# Patient Record
Sex: FEMALE | Race: BLACK OR AFRICAN AMERICAN | Employment: UNEMPLOYED | URBAN - METROPOLITAN AREA
[De-identification: names, ages, dates, MRNs, and addresses within clinical notes are randomized per-mention and may not be internally consistent; named-entity substitution may affect disease eponyms.]

---

## 2019-06-02 ENCOUNTER — HOSPITAL ENCOUNTER (EMERGENCY)
Facility: CLINIC | Age: 29
Discharge: HOME | End: 2019-06-02
Attending: EMERGENCY MEDICINE | Admitting: EMERGENCY MEDICINE
Payer: MEDICARE

## 2019-06-02 VITALS
OXYGEN SATURATION: 100 % | TEMPERATURE: 98 F | HEART RATE: 77 BPM | RESPIRATION RATE: 16 BRPM | SYSTOLIC BLOOD PRESSURE: 129 MMHG | DIASTOLIC BLOOD PRESSURE: 85 MMHG

## 2019-06-02 DIAGNOSIS — N80.9 ENDOMETRIOSIS: ICD-10-CM

## 2019-06-02 PROCEDURE — 99285 EMERGENCY DEPT VISIT HI MDM: CPT | Mod: 25 | Performed by: EMERGENCY MEDICINE

## 2019-06-02 PROCEDURE — 99285 EMERGENCY DEPT VISIT HI MDM: CPT | Mod: Z6 | Performed by: EMERGENCY MEDICINE

## 2019-06-02 PROCEDURE — 96374 THER/PROPH/DIAG INJ IV PUSH: CPT | Performed by: EMERGENCY MEDICINE

## 2019-06-02 PROCEDURE — 96376 TX/PRO/DX INJ SAME DRUG ADON: CPT | Performed by: EMERGENCY MEDICINE

## 2019-06-02 PROCEDURE — 25000128 H RX IP 250 OP 636: Performed by: EMERGENCY MEDICINE

## 2019-06-02 PROCEDURE — 96361 HYDRATE IV INFUSION ADD-ON: CPT | Performed by: EMERGENCY MEDICINE

## 2019-06-02 PROCEDURE — 96375 TX/PRO/DX INJ NEW DRUG ADDON: CPT | Performed by: EMERGENCY MEDICINE

## 2019-06-02 RX ORDER — ONDANSETRON 2 MG/ML
4 INJECTION INTRAMUSCULAR; INTRAVENOUS ONCE
Status: COMPLETED | OUTPATIENT
Start: 2019-06-02 | End: 2019-06-02

## 2019-06-02 RX ORDER — ONDANSETRON 4 MG/1
4 TABLET, ORALLY DISINTEGRATING ORAL EVERY 8 HOURS PRN
COMMUNITY
Start: 2019-03-09 | End: 2019-06-02

## 2019-06-02 RX ORDER — LORAZEPAM 2 MG/ML
0.5 INJECTION INTRAMUSCULAR ONCE
Status: COMPLETED | OUTPATIENT
Start: 2019-06-02 | End: 2019-06-02

## 2019-06-02 RX ORDER — ONDANSETRON 4 MG/1
4 TABLET, ORALLY DISINTEGRATING ORAL EVERY 8 HOURS PRN
Qty: 10 TABLET | Refills: 0 | Status: SHIPPED | OUTPATIENT
Start: 2019-06-02 | End: 2019-06-05

## 2019-06-02 RX ORDER — HYDROCODONE BITARTRATE AND ACETAMINOPHEN 5; 325 MG/1; MG/1
1-2 TABLET ORAL EVERY 6 HOURS PRN
COMMUNITY
Start: 2019-05-21 | End: 2019-06-02

## 2019-06-02 RX ORDER — HYDROMORPHONE HYDROCHLORIDE 1 MG/ML
0.5 INJECTION, SOLUTION INTRAMUSCULAR; INTRAVENOUS; SUBCUTANEOUS
Status: DISCONTINUED | OUTPATIENT
Start: 2019-06-02 | End: 2019-06-02 | Stop reason: HOSPADM

## 2019-06-02 RX ORDER — HYDROCODONE BITARTRATE AND ACETAMINOPHEN 5; 325 MG/1; MG/1
1 TABLET ORAL EVERY 6 HOURS PRN
Qty: 10 TABLET | Refills: 0 | Status: SHIPPED | OUTPATIENT
Start: 2019-06-02 | End: 2019-06-05

## 2019-06-02 RX ADMIN — ONDANSETRON 4 MG: 2 INJECTION INTRAMUSCULAR; INTRAVENOUS at 11:41

## 2019-06-02 RX ADMIN — SODIUM CHLORIDE 500 ML: 9 INJECTION, SOLUTION INTRAVENOUS at 11:40

## 2019-06-02 RX ADMIN — LORAZEPAM 0.5 MG: 2 INJECTION, SOLUTION INTRAMUSCULAR; INTRAVENOUS at 13:56

## 2019-06-02 RX ADMIN — HYDROMORPHONE HYDROCHLORIDE 0.5 MG: 1 INJECTION, SOLUTION INTRAMUSCULAR; INTRAVENOUS; SUBCUTANEOUS at 11:41

## 2019-06-02 RX ADMIN — HYDROMORPHONE HYDROCHLORIDE 0.5 MG: 1 INJECTION, SOLUTION INTRAMUSCULAR; INTRAVENOUS; SUBCUTANEOUS at 13:56

## 2019-06-02 ASSESSMENT — ENCOUNTER SYMPTOMS
DYSURIA: 1
ABDOMINAL PAIN: 1
BACK PAIN: 1
VOMITING: 1
NAUSEA: 1

## 2019-06-02 NOTE — ED NOTES
"Pt was told multiple times to stay in room until ride comes.   Pt walked out of room and out of ER doors and outside.   Writer followed pt outside, pt stated \" I need some fresh air\"    Advised pt that would follow pt back to room as pt received narcotic pain medication and was told to stay in room until ride was available.   Pt was advised ride would be needed at time of administration of narcotic medication.   Pt was sitting on edge of building and sister was in red car and drove car around  [place].  Security in waiting room called  and pt waved sister to park car.   Sister came into building demanding that narcotics are worn off and can drive at this time.   Charge nurse talking with sister at this time.    Pt continues to sit outside of building.   "

## 2019-06-02 NOTE — ED PROVIDER NOTES
"  History     Chief Complaint   Patient presents with     Back Pain     rt and buttock pain x 2 days     HPI  Winifred Ibarra is a 28 year old female who presents to the ED for evaluation of lower abdominal and back pain. The patient underwent a diagnostic laparoscopy for suspected endometriosis last week at [facility] with Dr. Gillespie, who prescribed her Norco for pain management. She states that she has experienced nausea with vomiting for the past two days and reports \"I can't keep my meds down because of my endometriosis\". She also reports that she as been \"shaky\" lately due to uncontrolled pain and is unable to self-administer her Lovenox injection, which she is on for a past history of pulmonary embolism. The patient also notes pain with urination and bowel movements, which is typical for her.  She took Zofran at home with no improvement of nausea or vomiting.       Allergies:  Allergies   Allergen Reactions     Gold      Silver      Sodium Hypochlorite      Bleach         Problem List:    There are no active problems to display for this patient.       Past Medical History:    No past medical history on file.    Past Surgical History:    No past surgical history on file.    Family History:    No family history on file.    Social History:  Marital Status:  Single [1]  Social History     Tobacco Use     Smoking status: Not on file   Substance Use Topics     Alcohol use: Not on file     Drug use: Not on file        Medications:      HYDROcodone-acetaminophen (NORCO) 5-325 MG tablet   medical cannabis (Patient's own supply)   ondansetron (ZOFRAN ODT) 4 MG ODT tab         Review of Systems   Constitutional: Negative for chills and fever.   HENT: Negative for congestion and sore throat.    Eyes: Negative for discharge.   Respiratory: Negative for cough and shortness of breath.    Cardiovascular: Negative for chest pain and leg swelling.   Gastrointestinal: Positive for abdominal pain, nausea and vomiting. " Negative for diarrhea.   Genitourinary: Positive for dysuria. Negative for frequency.   Musculoskeletal: Positive for back pain. Negative for myalgias.   Skin: Negative for color change and rash.   Neurological: Positive for tremors. Negative for weakness, light-headedness and headaches.   Hematological: Negative for adenopathy.   Psychiatric/Behavioral: Negative for agitation, behavioral problems and confusion. The patient is nervous/anxious.        Physical Exam   BP: 132/87  Pulse: 71  Heart Rate: 106  Temp: 98  F (36.7  C)  Resp: 16  SpO2: (!) 74 %      Physical Exam   Constitutional: She is oriented to person, place, and time. She appears well-developed and well-nourished. She appears distressed (anxious).   HENT:   Head: Normocephalic and atraumatic.   Mouth/Throat: Oropharynx is clear and moist.   Eyes: Pupils are equal, round, and reactive to light. Conjunctivae are normal.   Neck: Normal range of motion. Neck supple. No tracheal deviation present. No thyromegaly present.   Cardiovascular: Normal rate, regular rhythm, normal heart sounds and intact distal pulses.   No murmur heard.  Pulmonary/Chest: Effort normal and breath sounds normal. No respiratory distress. She has no wheezes. She exhibits no tenderness.   Abdominal: Soft. She exhibits no distension. There is tenderness.       Musculoskeletal: She exhibits no edema or tenderness.   Neurological: She is alert and oriented to person, place, and time. She has normal strength. No sensory deficit.   Skin: Skin is warm. No rash noted.   Psychiatric: She has a normal mood and affect. Her behavior is normal.       ED Course        Procedures                 No results found for this or any previous visit (from the past 24 hour(s)).    Medications   ondansetron (ZOFRAN) injection 4 mg (4 mg Intravenous Given 6/2/19 1141)   0.9% sodium chloride BOLUS (0 mLs Intravenous Stopped 6/2/19 1414)   LORazepam (ATIVAN) injection 0.5 mg (0.5 mg Intravenous Given 6/2/19  1356)         11:11 Patient assessed.      Assessments & Plan (with Medical Decision Making)   28-year-old female with a history of chronic lower abdominal pain which reportedly is related to endometriosis.  She has home pain medications, but has been unable to take secondary to nausea and vomiting.  She has not been able to take her medications over the past 2 days.  She is starting to feel shaky.  She has no fever.  She has a nonsurgical abdomen.  There is no evidence of systemic infection.  She is likely having some degree of narcotic withdrawal.  She was given fluids, pain medications, and antiemetics with ultimate improvement.  She will be discharged with follow-up per primary care.    I have reviewed the nursing notes.    I have reviewed the findings, diagnosis, plan and need for follow up with the patient.       Medication List      Modified    HYDROcodone-acetaminophen 5-325 MG tablet  Commonly known as:  NORCO  1 tablet, Oral, EVERY 6 HOURS PRN  What changed:      how much to take    reasons to take this     ondansetron 4 MG ODT tab  Commonly known as:  ZOFRAN ODT  4 mg, Oral, EVERY 8 HOURS PRN  What changed:  reasons to take this        ASK your doctor about these medications    LOVENOX 60 MG/0.6ML syringe  Generic drug:  enoxaparin  Ask about: Should I take this medication?            Final diagnoses:   Endometriosis       This document serves as a record of the services and decisions personally performed and made by Kodak Rooney MD. It was created on HIS/HER behalf by Ligia Velazquez, a trained medical scribe. The creation of this document is based the provider's statements to the medical scribe.  Ligia Velazquez 11:11 AM 6/2/2019    Provider:   The information in this document, created by the medical scribe for me, accurately reflects the services I personally performed and the decisions made by me. I have reviewed and approved this document for accuracy prior to leaving the patient care  area.  Kodak Rooney MD 11:11 AM 6/2/2019 6/2/2019   [facility] EMERGENCY DEPARTMENT     Kodak Rooney MD  06/05/19 1002

## 2019-06-02 NOTE — ED NOTES
"Pt reports had surgery for endometriosis last week at [facility] with Dr. Pierre.  Pt states \" every month around this same time I fall and can't keep my meds down because of my endometriosis\"    Pt's sister drove self to ER and is being seen in UC for pain and hurt herself at work and \"she's unable to help me and I'm not able to help her\"   Pt unable to give shot of lovenox to self \"because my hands are shaking and Im in pain\"  Pt states \" I haven't slept in 2 days\"    Pt reports hard to pee and poop and throwing up all due to endometriosis.  Pt has been falling and \"that has been since I\"ve been little and nobody knows why\"   Pt offered warm blanket, pt declined  "

## 2019-06-02 NOTE — ED NOTES
Upon reviewing discharge instructions pt is informed that because she rec'd narcotics she needs a ride home and the  needs to present to the pt's room.  Pt is calling for ride now.

## 2019-06-02 NOTE — ED AVS SNAPSHOT
[facility] Emergency Department  [address] [facility] [address]  Phone:  [phone]  Fax:  [phone]                                    Winifred Ibarra   MRN: [id number]    Department:  [facility] Emergency Department   Date of Visit:  6/2/2019           After Visit Summary Signature Page    I have received my discharge instructions, and my questions have been answered. I have discussed any challenges I see with this plan with the nurse or doctor.    ..........................................................................................................................................  Patient/Patient Representative Signature      ..........................................................................................................................................  Patient Representative Print Name and Relationship to Patient    ..................................................               ................................................  Date                                   Time    ..........................................................................................................................................  Reviewed by Signature/Title    ...................................................              ..............................................  Date                                               Time          22EPIC Rev 08/18

## 2019-06-02 NOTE — ED NOTES
"Pt standing by side of bed rocking back and forth.  Offered pt help,  Pt stated \" Im always going to be in pain\"   And didn't want any help.   Provider updated.   "

## 2019-06-05 ASSESSMENT — ENCOUNTER SYMPTOMS
CHILLS: 0
MYALGIAS: 0
AGITATION: 0
FEVER: 0
SHORTNESS OF BREATH: 0
COLOR CHANGE: 0
SORE THROAT: 0
CONFUSION: 0
NERVOUS/ANXIOUS: 1
WEAKNESS: 0
COUGH: 0
HEADACHES: 0
ADENOPATHY: 0
TREMORS: 1
EYE DISCHARGE: 0
DIARRHEA: 0
FREQUENCY: 0
LIGHT-HEADEDNESS: 0

## 2019-06-08 ENCOUNTER — APPOINTMENT (OUTPATIENT)
Dept: CT IMAGING | Facility: CLINIC | Age: 29
End: 2019-06-08
Attending: NURSE PRACTITIONER
Payer: MEDICARE

## 2019-06-08 ENCOUNTER — HOSPITAL ENCOUNTER (EMERGENCY)
Facility: CLINIC | Age: 29
Discharge: HOME | End: 2019-06-08
Attending: NURSE PRACTITIONER | Admitting: NURSE PRACTITIONER
Payer: MEDICARE

## 2019-06-08 VITALS
WEIGHT: 293 LBS | HEIGHT: 66 IN | SYSTOLIC BLOOD PRESSURE: 176 MMHG | OXYGEN SATURATION: 100 % | RESPIRATION RATE: 18 BRPM | BODY MASS INDEX: 47.09 KG/M2 | TEMPERATURE: 98.1 F | HEART RATE: 65 BPM | DIASTOLIC BLOOD PRESSURE: 93 MMHG

## 2019-06-08 DIAGNOSIS — M54.41 ACUTE LEFT-SIDED LOW BACK PAIN WITH RIGHT-SIDED SCIATICA: ICD-10-CM

## 2019-06-08 DIAGNOSIS — M62.830 BACK MUSCLE SPASM: ICD-10-CM

## 2019-06-08 LAB
ALBUMIN UR-MCNC: NEGATIVE MG/DL
AMORPH CRY #/AREA URNS HPF: ABNORMAL /HPF
APPEARANCE UR: ABNORMAL
BILIRUB UR QL STRIP: NEGATIVE
COLOR UR AUTO: ABNORMAL
GLUCOSE UR STRIP-MCNC: NEGATIVE MG/DL
HCG UR QL: NEGATIVE
HGB UR QL STRIP: ABNORMAL
KETONES UR STRIP-MCNC: NEGATIVE MG/DL
LEUKOCYTE ESTERASE UR QL STRIP: NEGATIVE
MUCOUS THREADS #/AREA URNS LPF: PRESENT /LPF
NITRATE UR QL: NEGATIVE
PH UR STRIP: 7.5 PH (ref 5–7)
RBC #/AREA URNS AUTO: 1 /HPF (ref 0–2)
SOURCE: ABNORMAL
SP GR UR STRIP: 1.01 (ref 1–1.03)
SQUAMOUS #/AREA URNS AUTO: 3 /HPF (ref 0–1)
UROBILINOGEN UR STRIP-MCNC: NORMAL MG/DL (ref 0–2)
WBC #/AREA URNS AUTO: 4 /HPF (ref 0–5)

## 2019-06-08 PROCEDURE — 25000132 ZZH RX MED GY IP 250 OP 250 PS 637: Mod: GY | Performed by: NURSE PRACTITIONER

## 2019-06-08 PROCEDURE — 74176 CT ABD & PELVIS W/O CONTRAST: CPT

## 2019-06-08 PROCEDURE — 81001 URINALYSIS AUTO W/SCOPE: CPT | Performed by: NURSE PRACTITIONER

## 2019-06-08 PROCEDURE — 81025 URINE PREGNANCY TEST: CPT | Performed by: NURSE PRACTITIONER

## 2019-06-08 PROCEDURE — 99284 EMERGENCY DEPT VISIT MOD MDM: CPT | Mod: 25

## 2019-06-08 PROCEDURE — A9270 NON-COVERED ITEM OR SERVICE: HCPCS | Mod: GY | Performed by: NURSE PRACTITIONER

## 2019-06-08 PROCEDURE — 25000128 H RX IP 250 OP 636: Performed by: NURSE PRACTITIONER

## 2019-06-08 PROCEDURE — 96372 THER/PROPH/DIAG INJ SC/IM: CPT

## 2019-06-08 RX ORDER — LIDOCAINE 4 G/G
2 PATCH TOPICAL ONCE
Status: DISCONTINUED | OUTPATIENT
Start: 2019-06-08 | End: 2019-06-08 | Stop reason: HOSPADM

## 2019-06-08 RX ORDER — KETOROLAC TROMETHAMINE 30 MG/ML
30 INJECTION, SOLUTION INTRAMUSCULAR; INTRAVENOUS ONCE
Status: COMPLETED | OUTPATIENT
Start: 2019-06-08 | End: 2019-06-08

## 2019-06-08 RX ORDER — METHOCARBAMOL 500 MG/1
1000 TABLET, FILM COATED ORAL ONCE
Status: COMPLETED | OUTPATIENT
Start: 2019-06-08 | End: 2019-06-08

## 2019-06-08 RX ORDER — METHOCARBAMOL 500 MG/1
TABLET, FILM COATED ORAL
Qty: 24 TABLET | Refills: 0 | Status: SHIPPED | OUTPATIENT
Start: 2019-06-08 | End: 2020-12-10

## 2019-06-08 RX ADMIN — METHOCARBAMOL 1000 MG: 500 TABLET ORAL at 11:40

## 2019-06-08 RX ADMIN — KETOROLAC TROMETHAMINE 30 MG: 30 INJECTION, SOLUTION INTRAMUSCULAR at 11:39

## 2019-06-08 RX ADMIN — LIDOCAINE 2 PATCH: 560 PATCH PERCUTANEOUS; TOPICAL; TRANSDERMAL at 11:40

## 2019-06-08 ASSESSMENT — ENCOUNTER SYMPTOMS
FEVER: 0
BACK PAIN: 1
ABDOMINAL PAIN: 0
CHILLS: 0
DYSURIA: 0
FLANK PAIN: 1

## 2019-06-08 ASSESSMENT — MIFFLIN-ST. JEOR: SCORE: 2189.65

## 2019-06-08 NOTE — ED AVS SNAPSHOT
[facility] Emergency Department  [address]  [facility] [address]  Phone:  [phone]  Fax:  [phone]                                    Winifred Ibarra   MRN: [id number]    Department:  [facility] Emergency Department   Date of Visit:  6/8/2019           After Visit Summary Signature Page    I have received my discharge instructions, and my questions have been answered. I have discussed any challenges I see with this plan with the nurse or doctor.    ..........................................................................................................................................  Patient/Patient Representative Signature      ..........................................................................................................................................  Patient Representative Print Name and Relationship to Patient    ..................................................               ................................................  Date                                   Time    ..........................................................................................................................................  Reviewed by Signature/Title    ...................................................              ..............................................  Date                                               Time          22EPIC Rev 08/18

## 2019-06-08 NOTE — ED TRIAGE NOTES
"Patient states \"I have right buttock pain, I have endometriosis and this is the same pain.  i've had it for 2 weeks, I've been to the ED twice for the same pain and my right arm hurts from having to hold it back there.\"  Patient is pointing to right flank for location of pain.  She holds her right arm around to put pressure on her flank.  ABCDs intact.  "

## 2019-06-08 NOTE — ED NOTES
Pt in room upset and crying. When asked what was wrong pt stated nothing. Pt given box of kleenex and told that said writer would stop back to check on her. If she needs anything to hit red button on call light. MD informed that pt was upset and crying.

## 2019-06-08 NOTE — ED PROVIDER NOTES
History     Chief Complaint:  Right flank pain    HPI   Winifred Ibarra is a 28 year old female with a history of blood clots who presents to the emergency department today with right flank pain. Patient states that she has endometriosis and has had a flare in the last week in her right flank and right buttock. Patient reports associated right arm pain because the pain is better when she pushes on the area. This pain has caused her to go to the ER multiple times looking for pain relief in order to be able to sleep and eat. Patient denies rash. Patient's OB GYN is on vacation and she is not able to get a shot until she comes back. Patient has been taking Norco and Topamax. Ibuprofen and Tylenol have not been helping. Patient had a CT scan of her abdomen 5 days ago, results below. She underwent a diagnostic laparoscopy showed confirmation of endometriosis on 5/22/19.     CT Abdomen Pelvis 6/3/19  IMPRESSION:   CONCLUSION:   1.  Small left adnexal cyst, presumed physiologic.  2.  No other findings to account for the patient's symptoms.    Allergies:  Gold  Silver  Sodium Hypochlorite    Medications:    Medical cannabis   Depakote  Seroquel  Haldol   Norco  Zofran   Topamax     Past Medical History:    Bipolar   Acute stress reaction  Suicidal ideation  Obesity  Pulmonary embolism  Endometriosis   Panic attacks   PTSD  PCD  Depression with anxiety   Dysmenorrhea     Past Surgical History:    History reviewed. No pertinent past surgical history.    Family History:    History reviewed. No pertinent family history.     Social History:  The patient was alone.  Smoking Status: Former  Smokeless Tobacco: Never  Alcohol Use: No    Marital Status:  Single     Review of Systems   Constitutional: Negative for chills and fever.   Gastrointestinal: Negative for abdominal pain.   Genitourinary: Positive for flank pain (right, radiating into right buttock). Negative for dysuria and vaginal bleeding.   Musculoskeletal: Positive for  "back pain.   Skin: Negative for rash.   All other systems reviewed and are negative.      Physical Exam     Patient Vitals for the past 24 hrs:   BP Temp Temp src Pulse Resp SpO2 Height Weight   06/08/19 1107 (!) 176/93 98.1  F (36.7  C) Temporal 65 18 100 % 1.676 m (5' 6\") 143.8 kg (317 lb)      Physical Exam  General: Alert, Mild  discomfort, well kept, morbidly obese  Eyes: PERRL, conjunctivae pink no scleral icterus or conjunctival injection  ENT:   Moist mucus membranes, posterior oropharynx clear without erythema or exudates, No lymphadenopathy, Normal voice  Resp:  Lungs clear to auscultation bilaterally, no crackles/rubs/wheezes. Good air movement  CV:  Normal rate and rhythm, no murmurs/rubs/gallops  GI:  Abdomen soft and non-distended.  Normoactive BS.  No tenderness, guarding or rebound, No masses  Skin:  Warm, dry.  No rashes or petechiae  Musculoskeletal: No peripheral edema or calf tenderness, Normal gross ROM, lumbar sacral paravertebral spasming with tenderness along the sciatic tract  Neuro: Alert and oriented to person/place/time, normal sensation  Psychiatric: Normal affect, cooperative, good eye contact  Emergency Department Course   Imaging:  Radiology findings were communicated with the patient  who voiced understanding of the findings.  Abd/pelvis CT no contrast - Stone Protocol   Final Result      IMPRESSION. :   1. No specific cause for right flank or back pain demonstrated.  Report per radiology      Laboratory:  Laboratory findings were communicated with the patient  who voiced understanding of the findings.  HCG Qualitative: negative   UA: slightly cloudy, light yellow urine with trace blood, 7.5 pH, 3 Squamous Epithelial, mucous present and few amorphous crystals o/w WNL      Interventions:  1139: Toradol 30 mg Intramuscular   1140: Lidocare 2 patches Transdermal   1140: Robaxin 1,000 mg PO      Emergency Department Course:  Nursing notes and vitals reviewed.  1120: I performed an exam " "of the patient as documented above.   The patient provided a urine sample here in the emergency department. This was sent for laboratory testing, findings above.  The patient was sent for a Abdomen Pelvis CT while in the emergency department, results above.   1253: Patient rechecked and updated.  Patient feels improved.   1404: Findings and plan explained to the Patient. Patient discharged home with instructions regarding supportive care, medications, and reasons to return. The importance of close follow-up was reviewed. The patient was prescribed Robaxin.    I personally reviewed the laboratory and imaging results with the Patient and answered all related questions prior to discharge.     Impression & Plan       Medical Decision Making:  Winifred Ibarra is a 28 year old female who presents today for evaluation of ongoing lower back discomfort.  She associated this with her endometrial pain however she did not have abdominal discomfort.  She described having right arm soreness as she is been having to \"press into her lower back\" to help with the discomfort.  Her examination showed a likely musculoskeletal source with paravertebral spasming.  She did have a small amount of blood in her urine so I did obtain a CT scan.  This showed no acute process.  She did have good relief with the above treatment.  This appears to be a musculoskeletal source and likely unrelated to her endometriosis.  She is advised on appropriate use of NSAIDs, lidocaine patches, and given a prescription for methocarbamol.  She has plan to follow-up with her primary care provider/OB this coming week.  No indication for further testing or treatment at this time.  Initial blood pressure was elevated.  This is most likely due to her discomfort.  As her symptoms are decreased so did her blood pressure.  I did advise her to follow-up with primary care regarding the possible elevated blood pressure.  She appears to be safe and appropriate for outpatient " management follow-up and is discharged home.         Diagnosis:    ICD-10-CM    1. Acute left-sided low back pain with right-sided sciatica M54.41    2. Back muscle spasm M62.830        Disposition:  discharged to home    Discharge Medications:     Medication List      Started    methocarbamol 500 MG tablet  Commonly known as:  ROBAXIN  1-2 tabs q TID PRN for muscle spasm/pain          Scribe Disclosure:  I, Krystle Michelle, am serving as a scribe at 11:15 AM on 6/8/2019 to document services personally performed by Tremayne Bran APRN based on my observations and the provider's statements to me.   6/8/2019   [facility] EMERGENCY DEPARTMENT       Tremayne Bran APRN CNP  06/08/19 1636

## 2019-06-08 NOTE — DISCHARGE INSTRUCTIONS
Ibuprofen or Naproxen and/or Tylenol scheduled for the next 3-5 days. 600 mg (three tabs) ibuprofen, 440 mg (two tabs) Naproxen, 650-1000 mg of Tylenol.  Ibuprofen and Tylenol are every 6 hours Naproxen is 2 times daily. Follow directions. Use OTC lidocaine patches as directed.   Ice or heat to area.  I recommend ice in the first 24-48 hours.    Discharge Instructions  Back Pain  You were seen today for back pain. Back pain can have many causes, but most will get better without surgery or other specific treatment. Sometimes there is a herniated (?slipped?) disc. We do not usually do MRI scans to look for these right away, since most herniated discs will get better on their own with time.  Today, we did not find any evidence that your back pain was caused by a serious condition. However, sometimes symptoms develop over time and cannot be found during an emergency visit, so it is very important that you follow up with your primary provider.  Generally, every Emergency Department visit should have a follow-up clinic visit with either a primary or a specialty clinic/provider. Please follow-up as instructed by your emergency provider today.    Return to the Emergency Department if:  You develop a fever with your back pain.   You have weakness or change in sensation in one or both legs.  You lose control of your bowels or bladder, or cannot empty your bladder (cannot pee).  Your pain gets much worse.     Follow-up with your provider:  Unless your pain has completely gone away, please make an appointment with your provider within one week. Most of the routine care for back pain is available in a clinic and not the Emergency Department. You may need further management of your back pain, such as more pain medication, imaging such as an X-ray or MRI, or physical therapy.    What can I do to help myself?  Remain Active -- People are often afraid that they will hurt their back further or delay recovery by remaining active, but  this is one of the best things you can do for your back. In fact, staying in bed for a long time to rest is not recommended. Studies have shown that people with low back pain recover faster when they remain active. Movement helps to bring blood flow to the muscles and relieve muscle spasms as well as preventing loss of muscle strength.  Heat -- Using a heating pad can help with low back pain during the first few weeks. Do not sleep with a heating pad, as you can be burned.   Pain medications - You may take a pain medication such as Tylenol  (acetaminophen), Advil , Motrin  (ibuprofen) or Aleve  (naproxen).  If you were given a prescription for medicine here today, be sure to read all of the information (including the package insert) that comes with your prescription.  This will include important information about the medicine, its side effects, and any warnings that you need to know about.  The pharmacist who fills the prescription can provide more information and answer questions you may have about the medicine.  If you have questions or concerns that the pharmacist cannot address, please call or return to the Emergency Department.   Remember that you can always come back to the Emergency Department if you are not able to see your regular provider in the amount of time listed above, if you get any new symptoms, or if there is anything that worries you.

## 2019-10-26 ENCOUNTER — OFFICE VISIT - HEALTHEAST (OUTPATIENT)
Dept: FAMILY MEDICINE | Facility: CLINIC | Age: 29
End: 2019-10-26

## 2019-10-26 DIAGNOSIS — S83.411A SPRAIN OF MEDIAL COLLATERAL LIGAMENT OF RIGHT KNEE, INITIAL ENCOUNTER: ICD-10-CM

## 2019-10-26 ASSESSMENT — MIFFLIN-ST. JEOR: SCORE: 2006.15

## 2020-05-03 ENCOUNTER — HOSPITAL ENCOUNTER (EMERGENCY)
Facility: CLINIC | Age: 30
Discharge: HOME | End: 2020-05-03
Attending: FAMILY MEDICINE | Admitting: FAMILY MEDICINE
Payer: MEDICARE

## 2020-05-03 VITALS
HEIGHT: 66 IN | HEART RATE: 63 BPM | BODY MASS INDEX: 47.09 KG/M2 | RESPIRATION RATE: 18 BRPM | SYSTOLIC BLOOD PRESSURE: 113 MMHG | TEMPERATURE: 97 F | WEIGHT: 293 LBS | OXYGEN SATURATION: 100 % | DIASTOLIC BLOOD PRESSURE: 67 MMHG

## 2020-05-03 DIAGNOSIS — M54.50 ACUTE LOW BACK PAIN, UNSPECIFIED BACK PAIN LATERALITY, UNSPECIFIED WHETHER SCIATICA PRESENT: ICD-10-CM

## 2020-05-03 DIAGNOSIS — R10.2 PELVIC PAIN: ICD-10-CM

## 2020-05-03 PROCEDURE — 96372 THER/PROPH/DIAG INJ SC/IM: CPT | Performed by: FAMILY MEDICINE

## 2020-05-03 PROCEDURE — 25000128 H RX IP 250 OP 636: Performed by: FAMILY MEDICINE

## 2020-05-03 PROCEDURE — 99285 EMERGENCY DEPT VISIT HI MDM: CPT | Performed by: FAMILY MEDICINE

## 2020-05-03 PROCEDURE — 99285 EMERGENCY DEPT VISIT HI MDM: CPT | Mod: Z6 | Performed by: FAMILY MEDICINE

## 2020-05-03 RX ORDER — LIDOCAINE 50 MG/G
1 PATCH TOPICAL EVERY 24 HOURS
Qty: 10 PATCH | Refills: 0 | Status: SHIPPED | OUTPATIENT
Start: 2020-05-03 | End: 2020-05-13

## 2020-05-03 RX ORDER — CELECOXIB 200 MG/1
200 CAPSULE ORAL DAILY PRN
Qty: 15 CAPSULE | Refills: 0 | Status: SHIPPED | OUTPATIENT
Start: 2020-05-03 | End: 2020-11-22

## 2020-05-03 RX ADMIN — HYDROMORPHONE HYDROCHLORIDE 1 MG: 1 INJECTION, SOLUTION INTRAMUSCULAR; INTRAVENOUS; SUBCUTANEOUS at 11:20

## 2020-05-03 ASSESSMENT — MIFFLIN-ST. JEOR: SCORE: 2130.22

## 2020-05-03 NOTE — DISCHARGE INSTRUCTIONS
Take Celebrex 200 mg once daily.  Continue acetaminophen 1000 mg 4 times daily.  Apply the Lidoderm patch for 12 hours at a time with a 12-hour period of having the patch off.  You may do this either during the day or at night, which ever you think may be more beneficial.  Follow-up with your primary care physician if you have continued symptoms.

## 2020-05-03 NOTE — ED NOTES
Patient c/o low back pain radiating down to right buttock into thigh.  No recent injury.  Tingling to right foot.  Not incontinent .  Having menses currently.  Legs elevated for moderate relief of pain.  Ice pack given   Patient stated pain due to endometriosis

## 2020-05-03 NOTE — ED PROVIDER NOTES
History     Chief Complaint   Patient presents with     Back Pain     right lower back pain radiating right leg.  Pt reports n/v due to endometriosis.        HPI    Winifred Ibarra is a 29 year old female who presents with back pain and pelvic pain.  This is a typical flareup of what she believes is endometriosis that comes with her menstrual cycles.  She states that when she gets that it is difficult for her to walk and therefore difficult to take care of her children.  She treats this with medical marijuana which she takes by tablet and by vaping.  She has also using acetaminophen but it does not give complete relief.  She says she cannot take ibuprofen because it upsets her stomach.  She is been given hydrocodone in the past but does not have anymore.  She has she is had an extensive work-up for multiple medical complaints including brain MRI cervical and lumbar spine MRIs and most recently electromyography.  She is also had electroencephalography.  These results have all been reassuring with a few nonspecific findings on MRI of the spine which have been stable on repeat testing.  She has no evidence of a high-grade radiculopathy or of significant spinal stenosis on her work-ups.  I reviewed her records through multiple different care systems through care everywhere.  She says she is intending to go for a consult at the [facility] but has not heard back.  She states that she has vomiting associated with her current symptoms which makes it difficult for her to take her medication.  She is been constipated.  She has not had diarrhea.  She does not have chest pain, shortness of breath, fevers, sore throat, myalgias.    Allergies:  Allergies   Allergen Reactions     Gold      Silver      Sodium Hypochlorite      Bleach         Problem List:    There are no active problems to display for this patient.       Past Medical History:    No past medical history on file.    Past Surgical History:    No past surgical  "history on file.    Family History:    No family history on file.    Social History:  Marital Status:  Single [1]  Social History     Tobacco Use     Smoking status: Not on file   Substance Use Topics     Alcohol use: Not on file     Drug use: Not on file        Medications:    celecoxib (CELEBREX) 200 MG capsule  lidocaine (LIDODERM) 5 % patch  medical cannabis (Patient's own supply)  methocarbamol (ROBAXIN) 500 MG tablet          Review of Systems    All other systems are reviewed and are negative    Physical Exam   BP: (!) 134/96  Pulse: 63  Heart Rate: 97  Temp: 97  F (36.1  C)  Resp: 18  Height: 167.6 cm (5' 6\")  Weight: 138.3 kg (305 lb)  SpO2: 100 %      Physical Exam    Nursing note and vitals were reviewed.  Constitutional: Awake and alert, morbidly obese 29-year-old in no apparent discomfort, writhing on the exam table, who does not appear acutely ill, and who answers questions appropriately and cooperates with examination.  HEENT: EOMI.   Neck: Freely mobile.  Cardiovascular: Cardiac examination reveals normal heart rate and regular rhythm without murmur.  Pulmonary/Chest: Breathing is unlabored.  Breath sounds are clear and equal bilaterally.  There no retractions, tachypnea, rales, wheezes, or rhonchi.  Musculoskeletal: Extremities are warm and well-perfused and without edema  Neurological: Alert, oriented, thought content logical, coherent.  Motor tone is normal.  Motor strength is intact on extension of the great toe, ankle dorsiflexion and plantar flexion, hip extension bilaterally.  No clonus.  Skin: Warm, dry, no rashes.  Psychiatric: Affect congruent with acute pain.      ED Course        Procedures               Critical Care time:  none               No results found for this or any previous visit (from the past 24 hour(s)).    Medications   HYDROmorphone (DILAUDID) injection 1 mg (1 mg Intramuscular Given 5/3/20 1120)       Assessments & Plan (with Medical Decision Making)     29-year-old " female with a long history of chronic pelvic and low back pain attributed to endometriosis and extensive work-up for numerous symptoms all reassuring presented with recurrence of her typical symptoms.  There was no indication for additional testing.  She received a dose of Dilaudid with good relief of her symptoms.  She has not tolerated ibuprofen in the past so she will try celecoxib.  She will also try Lidoderm patch which has helped in the past.  She will continue on Tylenol.  If she has persistent symptoms, she should follow-up in primary care or with 1 of her specialists.  She should return if new concerning symptoms develop.    I have reviewed the nursing notes.    I have reviewed the findings, diagnosis, plan and need for follow up with the patient.          Discharge Medication List as of 5/3/2020 11:54 AM      START taking these medications    Details   celecoxib (CELEBREX) 200 MG capsule Take 1 capsule (200 mg) by mouth daily as needed for moderate pain, Disp-15 capsule,R-0, E-Prescribe      lidocaine (LIDODERM) 5 % patch Place 1 patch onto the skin every 24 hours for 10 daysDisp-10 patch,[id number]             Final diagnoses:   Acute low back pain, unspecified back pain laterality, unspecified whether sciatica present   Pelvic pain       5/3/2020   [facility] EMERGENCY DEPARTMENT     Devin Sloan MD  05/03/20 2543

## 2020-05-03 NOTE — ED AVS SNAPSHOT
[facility] Emergency Department  [address] [facility] [address]  Phone:  [phone]  Fax:  [phone]                                    Winifred Ibarra   MRN: [id number]    Department:  [facility] Emergency Department   Date of Visit:  5/3/2020           After Visit Summary Signature Page    I have received my discharge instructions, and my questions have been answered. I have discussed any challenges I see with this plan with the nurse or doctor.    ..........................................................................................................................................  Patient/Patient Representative Signature      ..........................................................................................................................................  Patient Representative Print Name and Relationship to Patient    ..................................................               ................................................  Date                                   Time    ..........................................................................................................................................  Reviewed by Signature/Title    ...................................................              ..............................................  Date                                               Time          22EPIC Rev 08/18

## 2020-06-01 ENCOUNTER — HOSPITAL ENCOUNTER (EMERGENCY)
Facility: CLINIC | Age: 30
Discharge: HOME | End: 2020-06-01
Attending: PHYSICIAN ASSISTANT | Admitting: PHYSICIAN ASSISTANT
Payer: MEDICARE

## 2020-06-01 VITALS
DIASTOLIC BLOOD PRESSURE: 77 MMHG | BODY MASS INDEX: 47.09 KG/M2 | WEIGHT: 293 LBS | TEMPERATURE: 97.8 F | HEART RATE: 58 BPM | HEIGHT: 66 IN | OXYGEN SATURATION: 100 % | SYSTOLIC BLOOD PRESSURE: 140 MMHG | RESPIRATION RATE: 18 BRPM

## 2020-06-01 DIAGNOSIS — M54.9 ACUTE RIGHT-SIDED BACK PAIN, UNSPECIFIED BACK LOCATION: ICD-10-CM

## 2020-06-01 DIAGNOSIS — R10.2 PELVIC PAIN IN FEMALE: ICD-10-CM

## 2020-06-01 LAB
ALBUMIN SERPL-MCNC: 3.5 G/DL (ref 3.4–5)
ALP SERPL-CCNC: 69 U/L (ref 40–150)
ALT SERPL W P-5'-P-CCNC: 21 U/L (ref 0–50)
ANION GAP SERPL CALCULATED.3IONS-SCNC: 6 MMOL/L (ref 3–14)
AST SERPL W P-5'-P-CCNC: 16 U/L (ref 0–45)
BASOPHILS # BLD AUTO: 0 10E9/L (ref 0–0.2)
BASOPHILS NFR BLD AUTO: 0.3 %
BILIRUB SERPL-MCNC: 0.4 MG/DL (ref 0.2–1.3)
BUN SERPL-MCNC: 8 MG/DL (ref 7–30)
CALCIUM SERPL-MCNC: 8.6 MG/DL (ref 8.5–10.1)
CHLORIDE SERPL-SCNC: 110 MMOL/L (ref 94–109)
CO2 SERPL-SCNC: 25 MMOL/L (ref 20–32)
CREAT SERPL-MCNC: 0.76 MG/DL (ref 0.52–1.04)
DIFFERENTIAL METHOD BLD: NORMAL
EOSINOPHIL # BLD AUTO: 0 10E9/L (ref 0–0.7)
EOSINOPHIL NFR BLD AUTO: 0.2 %
ERYTHROCYTE [DISTWIDTH] IN BLOOD BY AUTOMATED COUNT: 13.5 % (ref 10–15)
GFR SERPL CREATININE-BSD FRML MDRD: >90 ML/MIN/{1.73_M2}
GLUCOSE SERPL-MCNC: 85 MG/DL (ref 70–99)
HCT VFR BLD AUTO: 38.4 % (ref 35–47)
HGB BLD-MCNC: 12.8 G/DL (ref 11.7–15.7)
IMM GRANULOCYTES # BLD: 0 10E9/L (ref 0–0.4)
IMM GRANULOCYTES NFR BLD: 0.2 %
LIPASE SERPL-CCNC: 71 U/L (ref 73–393)
LYMPHOCYTES # BLD AUTO: 1.2 10E9/L (ref 0.8–5.3)
LYMPHOCYTES NFR BLD AUTO: 19.5 %
MCH RBC QN AUTO: 32.2 PG (ref 26.5–33)
MCHC RBC AUTO-ENTMCNC: 33.3 G/DL (ref 31.5–36.5)
MCV RBC AUTO: 97 FL (ref 78–100)
MONOCYTES # BLD AUTO: 0.5 10E9/L (ref 0–1.3)
MONOCYTES NFR BLD AUTO: 7.8 %
NEUTROPHILS # BLD AUTO: 4.5 10E9/L (ref 1.6–8.3)
NEUTROPHILS NFR BLD AUTO: 72 %
NRBC # BLD AUTO: 0 10*3/UL
NRBC BLD AUTO-RTO: 0 /100
PLATELET # BLD AUTO: 294 10E9/L (ref 150–450)
POTASSIUM SERPL-SCNC: 3.7 MMOL/L (ref 3.4–5.3)
PROT SERPL-MCNC: 8.2 G/DL (ref 6.8–8.8)
RBC # BLD AUTO: 3.97 10E12/L (ref 3.8–5.2)
SODIUM SERPL-SCNC: 141 MMOL/L (ref 133–144)
WBC # BLD AUTO: 6.2 10E9/L (ref 4–11)

## 2020-06-01 PROCEDURE — 25800030 ZZH RX IP 258 OP 636: Performed by: PHYSICIAN ASSISTANT

## 2020-06-01 PROCEDURE — 96374 THER/PROPH/DIAG INJ IV PUSH: CPT

## 2020-06-01 PROCEDURE — 80053 COMPREHEN METABOLIC PANEL: CPT | Performed by: PHYSICIAN ASSISTANT

## 2020-06-01 PROCEDURE — 85025 COMPLETE CBC W/AUTO DIFF WBC: CPT | Performed by: PHYSICIAN ASSISTANT

## 2020-06-01 PROCEDURE — 99284 EMERGENCY DEPT VISIT MOD MDM: CPT | Mod: 25

## 2020-06-01 PROCEDURE — 83690 ASSAY OF LIPASE: CPT | Performed by: PHYSICIAN ASSISTANT

## 2020-06-01 PROCEDURE — 25000128 H RX IP 250 OP 636: Performed by: PHYSICIAN ASSISTANT

## 2020-06-01 PROCEDURE — 96372 THER/PROPH/DIAG INJ SC/IM: CPT

## 2020-06-01 PROCEDURE — 96361 HYDRATE IV INFUSION ADD-ON: CPT

## 2020-06-01 PROCEDURE — 99284 EMERGENCY DEPT VISIT MOD MDM: CPT | Mod: Z6 | Performed by: PHYSICIAN ASSISTANT

## 2020-06-01 RX ORDER — SODIUM CHLORIDE 9 MG/ML
1000 INJECTION, SOLUTION INTRAVENOUS CONTINUOUS
Status: DISCONTINUED | OUTPATIENT
Start: 2020-06-01 | End: 2020-06-01 | Stop reason: HOSPADM

## 2020-06-01 RX ORDER — ONDANSETRON 4 MG/1
4 TABLET, ORALLY DISINTEGRATING ORAL EVERY 8 HOURS PRN
Qty: 10 TABLET | Refills: 0 | Status: SHIPPED | OUTPATIENT
Start: 2020-06-01

## 2020-06-01 RX ORDER — ONDANSETRON 2 MG/ML
4 INJECTION INTRAMUSCULAR; INTRAVENOUS EVERY 30 MIN PRN
Status: DISCONTINUED | OUTPATIENT
Start: 2020-06-01 | End: 2020-06-01 | Stop reason: HOSPADM

## 2020-06-01 RX ADMIN — ONDANSETRON 4 MG: 2 INJECTION INTRAMUSCULAR; INTRAVENOUS at 16:38

## 2020-06-01 RX ADMIN — SODIUM CHLORIDE 1000 ML: 9 INJECTION, SOLUTION INTRAVENOUS at 16:37

## 2020-06-01 RX ADMIN — HYDROMORPHONE HYDROCHLORIDE 1 MG: 1 INJECTION, SOLUTION INTRAMUSCULAR; INTRAVENOUS; SUBCUTANEOUS at 16:38

## 2020-06-01 ASSESSMENT — ENCOUNTER SYMPTOMS
VOMITING: 1
RESPIRATORY NEGATIVE: 1
FEVER: 0
NAUSEA: 1
ABDOMINAL DISTENTION: 0
NEUROLOGICAL NEGATIVE: 1
DIARRHEA: 0
FLANK PAIN: 1
BACK PAIN: 1
FATIGUE: 0
CARDIOVASCULAR NEGATIVE: 1
DYSURIA: 0
CONSTIPATION: 0
HEMATOLOGIC/LYMPHATIC NEGATIVE: 1
EYES NEGATIVE: 1
ABDOMINAL PAIN: 0

## 2020-06-01 ASSESSMENT — MIFFLIN-ST. JEOR: SCORE: 2130.22

## 2020-06-01 NOTE — ED AVS SNAPSHOT
[facility] Emergency Department  [address] [facility] [address]  Phone:  [phone]  Fax:  [phone]                                    Winifred Ibarra   MRN: [id number]    Department:  [facility] Emergency Department   Date of Visit:  6/1/2020           After Visit Summary Signature Page    I have received my discharge instructions, and my questions have been answered. I have discussed any challenges I see with this plan with the nurse or doctor.    ..........................................................................................................................................  Patient/Patient Representative Signature      ..........................................................................................................................................  Patient Representative Print Name and Relationship to Patient    ..................................................               ................................................  Date                                   Time    ..........................................................................................................................................  Reviewed by Signature/Title    ...................................................              ..............................................  Date                                               Time          22EPIC Rev 08/18

## 2020-06-01 NOTE — ED PROVIDER NOTES
History     Chief Complaint   Patient presents with     Back Pain     pt states that she has to come in about once a month for fluids and pain medication for her endometriosis. pain is in right back     HPI  Winifred Ibarra is a 29 year old female who presents the emergency department today with back pain and pelvic pain that is typical flareup of what she believes is endometriosis that comes with her menstrual cycles.  Patient is currently menstruating and states that she is sexually active with women only.  Patient denies any concerns for pregnancy.  Patient states that when she gets the back pain it causes nausea and vomiting and she is unable to do her daily activities due to the fact that the pain is so severe.  Patient does treat this with medical marijuana and she also uses Tylenol over-the-counter with minimal relief.  Patient has an appointment at [facility] in a few days for further evaluation of the symptoms.  Patient has had MRIs, CT scans, laparoscopic surgeries in the past, and EMGs for these chronic symptoms.  Patient has an order for neurology consult for follow-up of abnormal brain MRI, they also ordered an MRI of the abdomen pelvis to rule out deep and filtrate of endometriosis this was done through her gynecologist on 5-19-20.  Patient also was given orders for hematology due to her history of multiple pulmonary emboli, cognitive behavioral therapy physical therapy and orthopedics and spine clinic.  Patient states that she is here today because she needs something for the pain since her over-the-counter treatment is working.  Patient in the past has had Celebrex, hydrocodone, and lidocaine patches which do not help.  Patient also states that the lidocaine patch is not covered by her insurance.  Patient states over the past few days she has had vomiting, nausea, and unable to keep anything down due to the pain.  Patient also states that this time the pain seems to be radiating up into the right  flank which is a little different from her previous episodes which she gets monthly and they correlate with her menstrual cycles.  Patient denies fever, headache, confusion, dizziness, chest pain, shortness of breath, heart racing or skipping beats, calf pain or swelling, significant abdominal pain, urinary symptoms/hematuria/dysuria/urgency or frequency, patient denies any vaginal discharge or irritation.    Allergies:  Allergies   Allergen Reactions     Gold      Silver      Sodium Hypochlorite      Bleach         Problem List:    There are no active problems to display for this patient.       Past Medical History:    No past medical history on file.    Past Surgical History:    No past surgical history on file.    Family History:    No family history on file.    Social History:  Marital Status:  Single [1]  Social History     Tobacco Use     Smoking status: Not on file   Substance Use Topics     Alcohol use: Not on file     Drug use: Not on file        Medications:    ondansetron (ZOFRAN ODT) 4 MG ODT tab  celecoxib (CELEBREX) 200 MG capsule  medical cannabis (Patient's own supply)  methocarbamol (ROBAXIN) 500 MG tablet          Review of Systems   Constitutional: Negative for fatigue and fever.   HENT: Negative.    Eyes: Negative.    Respiratory: Negative.    Cardiovascular: Negative.    Gastrointestinal: Positive for nausea and vomiting. Negative for abdominal distention, abdominal pain, constipation and diarrhea.   Genitourinary: Positive for flank pain (right sided lower and mid back pain that radiates into right leg. ), pelvic pain and vaginal bleeding (currently menstrating. ). Negative for decreased urine volume, dysuria, menstrual problem, vaginal discharge and vaginal pain.   Musculoskeletal: Positive for back pain.   Skin: Negative.    Neurological: Negative.    Hematological: Negative.    All other systems reviewed and are negative.      Physical Exam   BP: 123/82  Pulse: 72  Temp: 97.8  F (36.6  " C)  Resp: 18  Height: 167.6 cm (5' 6\")  Weight: 138.3 kg (305 lb)(stated)  SpO2: 100 %      Physical Exam  Vitals signs and nursing note reviewed.   Constitutional:       General: She is not in acute distress.     Appearance: Normal appearance. She is obese. She is not toxic-appearing.   Cardiovascular:      Rate and Rhythm: Normal rate and regular rhythm.      Heart sounds: Normal heart sounds.   Pulmonary:      Effort: Pulmonary effort is normal.      Breath sounds: Normal breath sounds.   Abdominal:      General: Bowel sounds are normal. There is no distension.      Palpations: Abdomen is soft. There is no mass.      Tenderness: There is abdominal tenderness (minimal suprapubic pressure ). There is no right CVA tenderness, left CVA tenderness, guarding or rebound.      Hernia: No hernia is present.   Genitourinary:     Comments: Offered pelvic exam in office today, but patient declined.   Musculoskeletal: Normal range of motion.         General: No swelling or tenderness.      Thoracic back: Normal.      Lumbar back: Normal.      Right lower leg: No edema.      Left lower leg: No edema.      Comments: Negative straight leg raise bilaterally.  No pain with dorsiflexion of great toe.  No clonus.  Muscle strength 5 out of 5 bilaterally throughout lower extremities.  Reflexes normal bilaterally throughout lower extremities.   Skin:     General: Skin is warm.      Findings: No erythema or rash.   Neurological:      General: No focal deficit present.      Mental Status: She is alert and oriented to person, place, and time.      GCS: GCS eye subscore is 4. GCS verbal subscore is 5. GCS motor subscore is 6.      Sensory: Sensation is intact.      Motor: Motor function is intact.      Gait: Gait is intact.   Psychiatric:         Mood and Affect: Mood normal.         Behavior: Behavior normal.         Thought Content: Thought content normal.         ED Course        Procedures              Critical Care time:  none      "          Results for orders placed or performed during the hospital encounter of 06/01/20 (from the past 24 hour(s))   CBC with platelets differential   Result Value Ref Range    WBC 6.2 4.0 - 11.0 10e9/L    RBC Count 3.97 3.8 - 5.2 10e12/L    Hemoglobin 12.8 11.7 - 15.7 g/dL    Hematocrit 38.4 35.0 - 47.0 %    MCV 97 78 - 100 fl    MCH 32.2 26.5 - 33.0 pg    MCHC 33.3 31.5 - 36.5 g/dL    RDW 13.5 10.0 - 15.0 %    Platelet Count 294 150 - 450 10e9/L    Diff Method Automated Method     % Neutrophils 72.0 %    % Lymphocytes 19.5 %    % Monocytes 7.8 %    % Eosinophils 0.2 %    % Basophils 0.3 %    % Immature Granulocytes 0.2 %    Nucleated RBCs 0 0 /100    Absolute Neutrophil 4.5 1.6 - 8.3 10e9/L    Absolute Lymphocytes 1.2 0.8 - 5.3 10e9/L    Absolute Monocytes 0.5 0.0 - 1.3 10e9/L    Absolute Eosinophils 0.0 0.0 - 0.7 10e9/L    Absolute Basophils 0.0 0.0 - 0.2 10e9/L    Abs Immature Granulocytes 0.0 0 - 0.4 10e9/L    Absolute Nucleated RBC 0.0    Comprehensive metabolic panel   Result Value Ref Range    Sodium 141 133 - 144 mmol/L    Potassium 3.7 3.4 - 5.3 mmol/L    Chloride 110 (H) 94 - 109 mmol/L    Carbon Dioxide 25 20 - 32 mmol/L    Anion Gap 6 3 - 14 mmol/L    Glucose 85 70 - 99 mg/dL    Urea Nitrogen 8 7 - 30 mg/dL    Creatinine 0.76 0.52 - 1.04 mg/dL    GFR Estimate >90 >60 mL/min/[1.73_m2]    GFR Estimate If Black >90 >60 mL/min/[1.73_m2]    Calcium 8.6 8.5 - 10.1 mg/dL    Bilirubin Total 0.4 0.2 - 1.3 mg/dL    Albumin 3.5 3.4 - 5.0 g/dL    Protein Total 8.2 6.8 - 8.8 g/dL    Alkaline Phosphatase 69 40 - 150 U/L    ALT 21 0 - 50 U/L    AST 16 0 - 45 U/L   Lipase   Result Value Ref Range    Lipase 71 (L) 73 - 393 U/L       Medications   0.9% sodium chloride BOLUS (0 mLs Intravenous Stopped 6/1/20 1715)     Followed by   sodium chloride 0.9% infusion (has no administration in time range)   ondansetron (ZOFRAN) injection 4 mg (4 mg Intravenous Given 6/1/20 1638)   HYDROmorphone (DILAUDID) injection 1 mg (1 mg  Intramuscular Given 6/1/20 7667)       Assessments & Plan (with Medical Decision Making)     I have reviewed the nursing notes.    I have reviewed the findings, diagnosis, plan and need for follow up with the patient.    Winifred Ibarra is a 29 year old female who presents the emergency department today with back pain and pelvic pain that is typical flareup of what she believes is endometriosis that comes with her menstrual cycles.  Patient is currently menstruating and states that she is sexually active with women only.  Patient denies any concerns for pregnancy.  Patient states that when she gets the back pain it causes nausea and vomiting and she is unable to do her daily activities due to the fact that the pain is so severe.  Patient does treat this with medical marijuana and she also uses Tylenol over-the-counter with minimal relief.  Patient has an appointment at [facility] in a few days for further evaluation of the symptoms.  Patient has had MRIs, CT scans, laparoscopic surgeries in the past, and EMGs for these chronic symptoms.  Patient has an order for neurology consult for follow-up of abnormal brain MRI, they also ordered an MRI of the abdomen pelvis to rule out deep and filtrate of endometriosis this was done through her gynecologist on 5-19-20.  Patient also was given orders for hematology due to her history of multiple pulmonary emboli, cognitive behavioral therapy physical therapy and orthopedics and spine clinic.  Patient states that she is here today because she needs something for the pain since her over-the-counter treatment is working.  Patient in the past has had Celebrex, hydrocodone, and lidocaine patches which do not help.  Patient also states that the lidocaine patch is not covered by her insurance.  Patient states over the past few days she has had vomiting, nausea, and unable to keep anything down due to the pain.  Patient also states that this time the pain seems to be radiating up  into the right flank which is a little different from her previous episodes which she gets monthly and they correlate with her menstrual cycles.  Patient denies fever, headache, confusion, dizziness, chest pain, shortness of breath, heart racing or skipping beats, calf pain or swelling, significant abdominal pain, urinary symptoms/hematuria/dysuria/urgency or frequency, patient denies any vaginal discharge or irritation.    See exam findings above.  Patient declined pelvic exam in the emergency department today.  No findings of acute lumbar issues or cauda equina.  Offered and ordered UA to rule out possible kidney stone or urinary tract infection however patient states she does not think she will be able to give us a sample since when she has this pain is more difficult to urinate and have bowel movements and she just went prior to coming in.  Patient declined UA in the office today.  I informed her that I cannot rule out these concerns and she is okay with this.  Patient is sexually active with women only and is not concerned of any pregnancy at this time.  CBC obtained which is within normal limits, comprehensive metabolic panel also normal and lipase within normal limits.  No imaging indicated at this time since this is chronic and reoccurring pain and no known trauma.  Patient given Zofran and fluids in the emergency department today which improved symptoms.  Patient also given single dose of Dilaudid IM which improved patient's pain.  Patient informed to keep her appointment with [facility] and to continue over-the-counter treatment as recommended.  Patient sent home with prescription Zofran for nausea and discharged in stable condition.  Patient return if symptoms worsen or change these were discussed with patient given a discharge paperwork.      Discharge Medication List as of 6/1/2020  5:15 PM      START taking these medications    Details   ondansetron (ZOFRAN ODT) 4 MG ODT tab Take 1 tablet (4 mg) by  mouth every 8 hours as needed for nausea, Disp-10 tablet,R-0, E-Prescribe             Final diagnoses:   Acute right-sided back pain, unspecified back location - flank and lower back pain.   Pelvic pain in female - with history of endometriosis       6/1/2020   [facility] EMERGENCY DEPARTMENT     Linda Amin PA-C  06/01/20 2468

## 2020-06-01 NOTE — ED NOTES
PT refused urine. Ok to go home per pt. zofran at pharm. Has ride here. Gave heat packs for home use and took ice pack.

## 2020-06-01 NOTE — PROGRESS NOTES
Pt is sitting in a chair with a hot pack to lower back. Has been vomiting and cannot keep anything down.

## 2020-06-01 NOTE — DISCHARGE INSTRUCTIONS
Heat and ice to back.      Tylenol and ibuprofen over-the-counter as needed for symptoms.    Keep your appointment with male for further evaluation and treatment of back and pelvic pain and endometriosis.    Return to the emergency department if urinary symptoms occur including painful urination, hematuria, burning, frequency, urgency, fevers, worsening abdominal pain, worsening back pain, or change in symptoms occur.

## 2020-06-02 ENCOUNTER — HOSPITAL ENCOUNTER (EMERGENCY)
Facility: CLINIC | Age: 30
Discharge: HOME | End: 2020-06-02
Attending: EMERGENCY MEDICINE | Admitting: EMERGENCY MEDICINE
Payer: MEDICARE

## 2020-06-02 VITALS
RESPIRATION RATE: 16 BRPM | HEIGHT: 66 IN | BODY MASS INDEX: 47.09 KG/M2 | SYSTOLIC BLOOD PRESSURE: 114 MMHG | TEMPERATURE: 98.4 F | HEART RATE: 79 BPM | DIASTOLIC BLOOD PRESSURE: 60 MMHG | OXYGEN SATURATION: 100 % | WEIGHT: 293 LBS

## 2020-06-02 DIAGNOSIS — N80.9 ENDOMETRIOSIS: ICD-10-CM

## 2020-06-02 DIAGNOSIS — G89.29 CHRONIC RIGHT-SIDED LOW BACK PAIN WITHOUT SCIATICA: ICD-10-CM

## 2020-06-02 DIAGNOSIS — M54.50 CHRONIC RIGHT-SIDED LOW BACK PAIN WITHOUT SCIATICA: ICD-10-CM

## 2020-06-02 LAB
ALBUMIN UR-MCNC: 30 MG/DL
APPEARANCE UR: CLEAR
BILIRUB UR QL STRIP: NEGATIVE
COLOR UR AUTO: YELLOW
GLUCOSE UR STRIP-MCNC: NEGATIVE MG/DL
HGB UR QL STRIP: NEGATIVE
KETONES UR STRIP-MCNC: 80 MG/DL
LEUKOCYTE ESTERASE UR QL STRIP: NEGATIVE
MUCOUS THREADS #/AREA URNS LPF: PRESENT /LPF
NITRATE UR QL: NEGATIVE
PH UR STRIP: 5 PH (ref 5–7)
RBC #/AREA URNS AUTO: 3 /HPF (ref 0–2)
SOURCE: ABNORMAL
SP GR UR STRIP: 1.03 (ref 1–1.03)
UROBILINOGEN UR STRIP-MCNC: 0 MG/DL (ref 0–2)
WBC #/AREA URNS AUTO: 1 /HPF (ref 0–5)

## 2020-06-02 PROCEDURE — 81001 URINALYSIS AUTO W/SCOPE: CPT | Performed by: EMERGENCY MEDICINE

## 2020-06-02 PROCEDURE — 96372 THER/PROPH/DIAG INJ SC/IM: CPT

## 2020-06-02 PROCEDURE — 99282 EMERGENCY DEPT VISIT SF MDM: CPT | Mod: Z6 | Performed by: EMERGENCY MEDICINE

## 2020-06-02 PROCEDURE — 99284 EMERGENCY DEPT VISIT MOD MDM: CPT | Mod: 25

## 2020-06-02 PROCEDURE — 25000128 H RX IP 250 OP 636: Performed by: EMERGENCY MEDICINE

## 2020-06-02 RX ADMIN — HYDROMORPHONE HYDROCHLORIDE 1 MG: 1 INJECTION, SOLUTION INTRAMUSCULAR; INTRAVENOUS; SUBCUTANEOUS at 13:10

## 2020-06-02 ASSESSMENT — MIFFLIN-ST. JEOR: SCORE: 2130.22

## 2020-06-02 NOTE — ED AVS SNAPSHOT
[facility] Emergency Department  [address] [address]  Phone:  [phone]  Fax:  [phone]                                    Winifred Ibarra   MRN: [id number]    Department:  [facility] Emergency Department   Date of Visit:  6/2/2020           After Visit Summary Signature Page    I have received my discharge instructions, and my questions have been answered. I have discussed any challenges I see with this plan with the nurse or doctor.    ..........................................................................................................................................  Patient/Patient Representative Signature      ..........................................................................................................................................  Patient Representative Print Name and Relationship to Patient    ..................................................               ................................................  Date                                   Time    ..........................................................................................................................................  Reviewed by Signature/Title    ...................................................              ..............................................  Date                                               Time          22EPIC Rev 08/18

## 2020-06-02 NOTE — ED NOTES
Pt here with endometriosis pain that goes down her right buttocks. Pt was here yesterday received something for nausea, IV fluids and dilaudid for pain. Since discharge yesterday pt has not been able to keep anything down. Pt is rating her pain 10/10.

## 2020-06-02 NOTE — ED TRIAGE NOTES
has been having trouble with endometriosis for 2 years; this flare started yesterday, nausea and vomiting, unable to keep meds down

## 2020-06-02 NOTE — ED PROVIDER NOTES
History     Chief Complaint   Patient presents with     Back Pain     has been having trouble with endometriosis for 2 years; this flare started yesterday, nausea and vomiting, unable to keep meds down     HPI  Winifred Ibarra is a 29 year old female who presents for the second day with acute on chronic right low back pain.  Work-up yesterday included CBC, CMP and lipase which are unremarkable.  She reports multiple episodes of similar discomfort described as sharp and burning with menses.  She reports a distant laparoscopy significant for endometriosis.  She had a virtual consult with GYN at [facility] on 5/19 and is reportedly scheduled to have follow-up there 1 week from today.  She is tried OTC analgesics with minimal relief.  She reports vomiting secondary to pain.  She continues to be able to take fluids and is urinated today.  No associated fever.  Denies focal neurologic change.  Last bowel movement yesterday reported as hard which is usual for her, no black or bloody component.  No other abdominal surgery.  Work-up has included MRI of the brain and spine.  CT abdomen pelvis 6/19 unremarkable.  Last pelvic ultrasound in the chart dates 3/9/2019 unremarkable.    Allergies:  Allergies   Allergen Reactions     Gold      Silver      Sodium Hypochlorite      Bleach         Problem List:    There are no active problems to display for this patient.       Past Medical History:    No past medical history on file.    Past Surgical History:    No past surgical history on file.    Family History:    No family history on file.    Social History:  Marital Status:  Single [1]  Social History     Tobacco Use     Smoking status: Not on file   Substance Use Topics     Alcohol use: Not on file     Drug use: Not on file        Medications:    celecoxib (CELEBREX) 200 MG capsule  medical cannabis (Patient's own supply)  methocarbamol (ROBAXIN) 500 MG tablet  ondansetron (ZOFRAN ODT) 4 MG ODT tab          Review of Systems  All  "other systems reviewed and are negative.    Physical Exam   BP: 126/77  Pulse: 79  Heart Rate: 107  Temp: 98.4  F (36.9  C)  Resp: 16  Height: 167.6 cm (5' 6\")  Weight: 138.3 kg (305 lb)  SpO2: 99 %      Physical Exam  Crying, in obvious distress  Head atraumatic normocephalic  Able to go from semirecumbent to sitting on her own  Lungs clear  Tender right paralumbar  Strength and sensation grossly intact throughout the extremities  Heart regular no murmur  Abdomen soft bowel sounds positive nontender  ED Course        Procedures               Critical Care time:  none               Results for orders placed or performed during the hospital encounter of 06/02/20   UA reflex to Microscopic     Status: Abnormal   Result Value Ref Range    Color Urine Yellow     Appearance Urine Clear     Glucose Urine Negative NEG^Negative mg/dL    Bilirubin Urine Negative NEG^Negative    Ketones Urine 80 (A) NEG^Negative mg/dL    Specific Gravity Urine 1.026 1.003 - 1.035    Blood Urine Negative NEG^Negative    pH Urine 5.0 5.0 - 7.0 pH    Protein Albumin Urine 30 (A) NEG^Negative mg/dL    Urobilinogen mg/dL 0.0 0.0 - 2.0 mg/dL    Nitrite Urine Negative NEG^Negative    Leukocyte Esterase Urine Negative NEG^Negative    Source Catheterized Urine     RBC Urine 3 (H) 0 - 2 /HPF    WBC Urine 1 0 - 5 /HPF    Mucous Urine Present (A) NEG^Negative /LPF         Medications   HYDROmorphone (DILAUDID) injection 1 mg (1 mg Intramuscular Given 6/2/20 1310)       Assessments & Plan (with Medical Decision Making)  Acute on chronic right low back pain and tenderness.  Urinalysis 80 ketones otherwise unremarkable.  Patient has known history of endometriosis.  Abdominal exam is benign, work-up yesterday CBC, CMP lipase within normal limits.  Follow-up next week [facility].  Call your primary care doctor for further pain medication.  Return criteria reviewed     I have reviewed the nursing notes.    I have reviewed the findings, diagnosis, plan and " need for follow up with the patient.          Discharge Medication List as of 6/2/2020  3:30 PM          Final diagnoses:   Chronic right-sided low back pain without sciatica   Endometriosis       6/2/2020   [facility] EMERGENCY DEPARTMENT     Fabrizio Edge MD  06/02/20 8496

## 2020-08-28 ENCOUNTER — HOSPITAL ENCOUNTER (EMERGENCY)
Facility: CLINIC | Age: 30
Discharge: HOME | End: 2020-08-28
Attending: EMERGENCY MEDICINE | Admitting: EMERGENCY MEDICINE
Payer: MEDICARE

## 2020-08-28 VITALS
RESPIRATION RATE: 18 BRPM | DIASTOLIC BLOOD PRESSURE: 68 MMHG | TEMPERATURE: 98.1 F | HEART RATE: 57 BPM | WEIGHT: 280 LBS | BODY MASS INDEX: 45 KG/M2 | OXYGEN SATURATION: 100 % | HEIGHT: 66 IN | SYSTOLIC BLOOD PRESSURE: 136 MMHG

## 2020-08-28 DIAGNOSIS — N39.0 ACUTE UTI: ICD-10-CM

## 2020-08-28 DIAGNOSIS — M54.41 RIGHT-SIDED LOW BACK PAIN WITH RIGHT-SIDED SCIATICA, UNSPECIFIED CHRONICITY: ICD-10-CM

## 2020-08-28 DIAGNOSIS — N80.9 ENDOMETRIOSIS: ICD-10-CM

## 2020-08-28 PROBLEM — Z86.711 HX PULMONARY EMBOLISM: Status: ACTIVE | Noted: 2019-04-08

## 2020-08-28 PROBLEM — N94.6 DYSMENORRHEA: Status: ACTIVE | Noted: 2018-05-28

## 2020-08-28 PROBLEM — M62.838 MUSCLE SPASM: Status: ACTIVE | Noted: 2020-08-28

## 2020-08-28 PROBLEM — R10.2 PELVIC AND PERINEAL PAIN: Status: ACTIVE | Noted: 2020-07-09

## 2020-08-28 LAB
ALBUMIN SERPL-MCNC: 3.2 G/DL (ref 3.4–5)
ALBUMIN UR-MCNC: 30 MG/DL
ALP SERPL-CCNC: 55 U/L (ref 40–150)
ALT SERPL W P-5'-P-CCNC: 26 U/L (ref 0–50)
ANION GAP SERPL CALCULATED.3IONS-SCNC: 5 MMOL/L (ref 3–14)
APPEARANCE UR: ABNORMAL
AST SERPL W P-5'-P-CCNC: 18 U/L (ref 0–45)
BACTERIA #/AREA URNS HPF: ABNORMAL /HPF
BASOPHILS # BLD AUTO: 0 10E9/L (ref 0–0.2)
BASOPHILS NFR BLD AUTO: 0.4 %
BILIRUB DIRECT SERPL-MCNC: <0.1 MG/DL (ref 0–0.2)
BILIRUB SERPL-MCNC: 0.3 MG/DL (ref 0.2–1.3)
BILIRUB UR QL STRIP: NEGATIVE
BUN SERPL-MCNC: 9 MG/DL (ref 7–30)
CALCIUM SERPL-MCNC: 8.2 MG/DL (ref 8.5–10.1)
CHLORIDE SERPL-SCNC: 112 MMOL/L (ref 94–109)
CO2 SERPL-SCNC: 25 MMOL/L (ref 20–32)
COLOR UR AUTO: YELLOW
CREAT SERPL-MCNC: 0.69 MG/DL (ref 0.52–1.04)
DIFFERENTIAL METHOD BLD: ABNORMAL
EOSINOPHIL # BLD AUTO: 0.1 10E9/L (ref 0–0.7)
EOSINOPHIL NFR BLD AUTO: 1 %
ERYTHROCYTE [DISTWIDTH] IN BLOOD BY AUTOMATED COUNT: 13 % (ref 10–15)
GFR SERPL CREATININE-BSD FRML MDRD: >90 ML/MIN/{1.73_M2}
GLUCOSE SERPL-MCNC: 91 MG/DL (ref 70–99)
GLUCOSE UR STRIP-MCNC: NEGATIVE MG/DL
HCG UR QL: NEGATIVE
HCT VFR BLD AUTO: 36 % (ref 35–47)
HGB BLD-MCNC: 11.8 G/DL (ref 11.7–15.7)
HGB UR QL STRIP: ABNORMAL
IMM GRANULOCYTES # BLD: 0 10E9/L (ref 0–0.4)
IMM GRANULOCYTES NFR BLD: 0.4 %
KETONES UR STRIP-MCNC: 5 MG/DL
LEUKOCYTE ESTERASE UR QL STRIP: ABNORMAL
LYMPHOCYTES # BLD AUTO: 1.8 10E9/L (ref 0.8–5.3)
LYMPHOCYTES NFR BLD AUTO: 35.1 %
MCH RBC QN AUTO: 32.2 PG (ref 26.5–33)
MCHC RBC AUTO-ENTMCNC: 32.8 G/DL (ref 31.5–36.5)
MCV RBC AUTO: 98 FL (ref 78–100)
MONOCYTES # BLD AUTO: 0.5 10E9/L (ref 0–1.3)
MONOCYTES NFR BLD AUTO: 9.9 %
MUCOUS THREADS #/AREA URNS LPF: PRESENT /LPF
NEUTROPHILS # BLD AUTO: 2.7 10E9/L (ref 1.6–8.3)
NEUTROPHILS NFR BLD AUTO: 53.2 %
NITRATE UR QL: NEGATIVE
NRBC # BLD AUTO: 0 10*3/UL
NRBC BLD AUTO-RTO: 0 /100
PH UR STRIP: 6 PH (ref 5–7)
PLATELET # BLD AUTO: 267 10E9/L (ref 150–450)
POTASSIUM SERPL-SCNC: 3.7 MMOL/L (ref 3.4–5.3)
PROT SERPL-MCNC: 7.2 G/DL (ref 6.8–8.8)
RBC # BLD AUTO: 3.67 10E12/L (ref 3.8–5.2)
RBC #/AREA URNS AUTO: >182 /HPF (ref 0–2)
SODIUM SERPL-SCNC: 142 MMOL/L (ref 133–144)
SOURCE: ABNORMAL
SP GR UR STRIP: 1.02 (ref 1–1.03)
SQUAMOUS #/AREA URNS AUTO: 1 /HPF (ref 0–1)
UROBILINOGEN UR STRIP-MCNC: 4 MG/DL (ref 0–2)
WBC # BLD AUTO: 5.1 10E9/L (ref 4–11)
WBC #/AREA URNS AUTO: 28 /HPF (ref 0–5)

## 2020-08-28 PROCEDURE — 96361 HYDRATE IV INFUSION ADD-ON: CPT | Performed by: EMERGENCY MEDICINE

## 2020-08-28 PROCEDURE — 80076 HEPATIC FUNCTION PANEL: CPT | Performed by: EMERGENCY MEDICINE

## 2020-08-28 PROCEDURE — 81001 URINALYSIS AUTO W/SCOPE: CPT | Performed by: EMERGENCY MEDICINE

## 2020-08-28 PROCEDURE — 87086 URINE CULTURE/COLONY COUNT: CPT | Performed by: EMERGENCY MEDICINE

## 2020-08-28 PROCEDURE — 96376 TX/PRO/DX INJ SAME DRUG ADON: CPT | Performed by: EMERGENCY MEDICINE

## 2020-08-28 PROCEDURE — 25800030 ZZH RX IP 258 OP 636: Performed by: EMERGENCY MEDICINE

## 2020-08-28 PROCEDURE — 80048 BASIC METABOLIC PNL TOTAL CA: CPT | Performed by: EMERGENCY MEDICINE

## 2020-08-28 PROCEDURE — 81025 URINE PREGNANCY TEST: CPT | Performed by: EMERGENCY MEDICINE

## 2020-08-28 PROCEDURE — 96375 TX/PRO/DX INJ NEW DRUG ADDON: CPT | Performed by: EMERGENCY MEDICINE

## 2020-08-28 PROCEDURE — 96374 THER/PROPH/DIAG INJ IV PUSH: CPT | Performed by: EMERGENCY MEDICINE

## 2020-08-28 PROCEDURE — 25000128 H RX IP 250 OP 636: Performed by: EMERGENCY MEDICINE

## 2020-08-28 PROCEDURE — 85025 COMPLETE CBC W/AUTO DIFF WBC: CPT | Performed by: EMERGENCY MEDICINE

## 2020-08-28 PROCEDURE — 99285 EMERGENCY DEPT VISIT HI MDM: CPT | Mod: Z6 | Performed by: EMERGENCY MEDICINE

## 2020-08-28 PROCEDURE — 99285 EMERGENCY DEPT VISIT HI MDM: CPT | Mod: 25 | Performed by: EMERGENCY MEDICINE

## 2020-08-28 RX ORDER — KETOROLAC TROMETHAMINE 15 MG/ML
15 INJECTION, SOLUTION INTRAMUSCULAR; INTRAVENOUS ONCE
Status: COMPLETED | OUTPATIENT
Start: 2020-08-28 | End: 2020-08-28

## 2020-08-28 RX ORDER — OXYCODONE AND ACETAMINOPHEN 5; 325 MG/1; MG/1
1-2 TABLET ORAL EVERY 4 HOURS PRN
Qty: 2 TABLET | Refills: 0 | Status: SHIPPED | OUTPATIENT
Start: 2020-08-28 | End: 2020-10-14

## 2020-08-28 RX ORDER — SODIUM CHLORIDE 9 MG/ML
INJECTION, SOLUTION INTRAVENOUS CONTINUOUS
Status: DISCONTINUED | OUTPATIENT
Start: 2020-08-28 | End: 2020-08-28 | Stop reason: HOSPADM

## 2020-08-28 RX ORDER — ONDANSETRON 4 MG/1
4 TABLET, ORALLY DISINTEGRATING ORAL ONCE
Status: COMPLETED | OUTPATIENT
Start: 2020-08-28 | End: 2020-08-28

## 2020-08-28 RX ORDER — CIPROFLOXACIN 500 MG/1
500 TABLET, FILM COATED ORAL 2 TIMES DAILY
Qty: 6 TABLET | Refills: 0 | Status: SHIPPED | OUTPATIENT
Start: 2020-08-28 | End: 2020-08-31

## 2020-08-28 RX ORDER — HYDROMORPHONE HYDROCHLORIDE 1 MG/ML
0.5 INJECTION, SOLUTION INTRAMUSCULAR; INTRAVENOUS; SUBCUTANEOUS ONCE
Status: COMPLETED | OUTPATIENT
Start: 2020-08-28 | End: 2020-08-28

## 2020-08-28 RX ADMIN — HYDROMORPHONE HYDROCHLORIDE 0.5 MG: 1 INJECTION, SOLUTION INTRAMUSCULAR; INTRAVENOUS; SUBCUTANEOUS at 06:39

## 2020-08-28 RX ADMIN — ONDANSETRON 4 MG: 4 TABLET, ORALLY DISINTEGRATING ORAL at 06:15

## 2020-08-28 RX ADMIN — SODIUM CHLORIDE: 9 INJECTION, SOLUTION INTRAVENOUS at 07:52

## 2020-08-28 RX ADMIN — HYDROMORPHONE HYDROCHLORIDE 0.5 MG: 1 INJECTION, SOLUTION INTRAMUSCULAR; INTRAVENOUS; SUBCUTANEOUS at 07:52

## 2020-08-28 RX ADMIN — KETOROLAC TROMETHAMINE 15 MG: 15 INJECTION, SOLUTION INTRAMUSCULAR; INTRAVENOUS at 07:52

## 2020-08-28 RX ADMIN — SODIUM CHLORIDE 1000 ML: 9 INJECTION, SOLUTION INTRAVENOUS at 06:39

## 2020-08-28 ASSESSMENT — MIFFLIN-ST. JEOR: SCORE: 2016.82

## 2020-08-28 NOTE — ED PROVIDER NOTES
History     Chief Complaint   Patient presents with     Pelvic Pain     HPI  Winifred Ibarra is a 29 year old female with past medical history significant for bipolar disorder, endometriosis and chronic back pain who presents the emergency department complaining of nausea vomiting and back pain.  Patient states she has chronic endometriosis and gets pain with her menses.  Menses began 2 days ago and she began having nausea and vomiting as well as pain in her right lumbar region radiating down into her buttock and thigh.  She states this is typical of her endometriosis.  She has been seen numerous times at many hospitals including [facility] for work-up of this she has had previous surgery to remove the endometriosis and tried hormone therapy and multiple medications.  She states she continues to have persistent pain.  She denies any fevers or chills.  She has not had any chest pain or shortness of breath.  She denies any abdominal pain.  She has not had any urinary symptoms.  She has had normal bowel movements but pain with them.  She denies any rectal lesions or pain in her rectum.  She denies any rash.  She has not had any focal numbness weakness any extremity denies any chance of pregnancy currently rates her pain a 7 out of 10 worsened with activity.    Allergies:  Allergies   Allergen Reactions     Gold      Ibuprofen Nausea and Vomiting     Silver      Sodium Hypochlorite      Bleach         Problem List:    Patient Active Problem List    Diagnosis Date Noted     Muscle spasm 08/28/2020     Priority: Medium     Pelvic and perineal pain 07/09/2020     Priority: Medium     Endometriosis 04/09/2019     Priority: Medium     Added automatically from request for surgery [id number]  Added automatically from request for surgery [id number]  Added automatically from request for surgery [id number]  Added automatically from request for surgery [id number]       Hx pulmonary embolism 04/08/2019     Priority: Medium     Per patient report  "in [place], states negative workup for provoking factors, stopped anticoagulation use when moved to [place] not by medical advise  Per patient report in [place], states negative workup for provoking factors, stopped anticoagulation use when moved to [place] not by medical advise  Per patient report in [place], states negative workup for provoking factors, stopped anticoagulation use when moved to [place] not by medical advise  Per patient report in [place], states negative workup for provoking factors, stopped anticoagulation use when moved to [place] not by medical advise       Dysmenorrhea 05/28/2018     Priority: Medium     Acute stress reaction 12/12/2015     Priority: Medium     Partner \"put me out\" on Wednesday, 12/09/15.       Bipolar disorder, curr episode depressed, severe, w/psychotic features (H) 12/12/2015     Priority: Medium     H/o \"getting the shot\" (deconoate medication?), previous hospitalization in [place] (4 years ago when pt 20 yo).       History of being obese 12/12/2015     Priority: Medium     Suicidal ideations 12/12/2015     Priority: Medium     Bipolar affective disorder, currently depressed, moderate (H) 05/27/2015     Priority: Medium     Depression with anxiety 05/27/2015     Priority: Medium     OCD (obsessive compulsive disorder) 05/27/2015     Priority: Medium     PTSD (post-traumatic stress disorder) 05/27/2015     Priority: Medium     Encounter for long-term (current) use of medications 05/27/2015     Priority: Medium     Encounter for long-term (current) use of other medications       History of DVT (deep vein thrombosis) 05/27/2015     Priority: Medium     History of DVT (deep vein thrombosis) - per patient recall  History of DVT (deep vein thrombosis) - per patient recall  History of DVT (deep vein thrombosis) - per patient recall  History of DVT (deep vein thrombosis) - per patient recall       Panic attacks 05/27/2015     Priority: Medium     Social phobia 05/27/2015     Priority: Medium     " "Substance abuse in remission (H) 05/27/2015     Priority: Medium        Past Medical History:    Past Medical History:   Diagnosis Date     Anxiety      Bipolar depression (H)      Chronic abdominal pain      Chronic back pain      Chronic insomnia      Depressive disorder      DVT (deep vein thrombosis) in pregnancy      Endometriosis      Ovarian cyst      PE (pulmonary thromboembolism) (H)        Past Surgical History:    Past Surgical History:   Procedure Laterality Date     DAVINCI ASSISTED ABLATION / EXCISION OF ENDOMETRIOSIS         Family History:    No family history on file.    Social History:  Marital Status:  Single [1]  Social History     Tobacco Use     Smoking status: None   Substance Use Topics     Alcohol use: None     Drug use: None        Medications:    celecoxib (CELEBREX) 200 MG capsule  medical cannabis (Patient's own supply)  methocarbamol (ROBAXIN) 500 MG tablet  ondansetron (ZOFRAN ODT) 4 MG ODT tab          Review of Systems  All systems reviewed and other than pertinent positives and negatives in HPI all other systems are negative.  Physical Exam   BP: 131/61  Pulse: 78  Temp: 98.1  F (36.7  C)  Resp: 18  Height: 167.6 cm (5' 6\")  Weight: 127 kg (280 lb)  SpO2: 97 %      Physical Exam  Vitals signs and nursing note reviewed.   Constitutional:       Appearance: Normal appearance. She is not ill-appearing, toxic-appearing or diaphoretic.      Comments: Mild distress   HENT:      Head: Normocephalic and atraumatic.      Nose: Nose normal.      Mouth/Throat:      Mouth: Mucous membranes are moist.      Pharynx: Oropharynx is clear.   Eyes:      Conjunctiva/sclera: Conjunctivae normal.   Neck:      Musculoskeletal: Normal range of motion and neck supple.   Cardiovascular:      Rate and Rhythm: Normal rate and regular rhythm.      Pulses: Normal pulses.      Heart sounds: Normal heart sounds. No murmur.   Pulmonary:      Effort: Pulmonary effort is normal.      Breath sounds: Normal breath " sounds. No wheezing or rales.   Abdominal:      General: Abdomen is flat.      Palpations: Abdomen is soft.      Comments: Obese   Musculoskeletal:      Comments: Tenderness to palpation of the right lumbar region and sacroiliac joint.  There is tenderness to palpation of the posterior thigh region.  No erythema or swelling noted no midline back tenderness.  No calf tenderness pulses sensation symmetrical.   Skin:     General: Skin is warm and dry.      Capillary Refill: Capillary refill takes less than 2 seconds.      Findings: No rash.   Neurological:      General: No focal deficit present.      Mental Status: She is alert and oriented to person, place, and time.   Psychiatric:         Mood and Affect: Mood normal.         ED Course        Procedures               Critical Care time:  none               No results found for this or any previous visit (from the past 24 hour(s)).    Medications   ondansetron (ZOFRAN-ODT) ODT tab 4 mg (4 mg Oral Given 8/28/20 0615)   HYDROmorphone (PF) (DILAUDID) injection 0.5 mg (0.5 mg Intravenous Given 8/28/20 0639)   0.9% sodium chloride BOLUS (0 mLs Intravenous Stopped 8/28/20 0803)   HYDROmorphone (PF) (DILAUDID) injection 0.5 mg (0.5 mg Intravenous Given 8/28/20 0752)   ketorolac (TORADOL) injection 15 mg (15 mg Intravenous Given 8/28/20 0752)       Assessments & Plan (with Medical Decision Making) records were reviewed.  Labs were obtained.  Patient was given Zofran for nausea and Dilaudid for pain.  Urinalysis with large blood 30 protein for bile imaging trace leukocyte Estrace greater than 182 RBCs, 28 WBCs few bacteria present.  Pregnancy test was negative.  White count was 5.1 hemoglobin 11.8 platelet count was 267.  There is no left shift.  Comprehensive metabolic panel without significant abnormality.  Urine culture was sent.  A second dose of Dilaudid along with Toradol was given to the patient.  She had significant improvement of her pain.  I discussed possible CT  scan and further imaging studies but patient states that this is typical of her exacerbations of the endometriosis and she does not think she needs imaging studies.  She will return if symptoms worsen or new symptoms develop and follow-up with her primary care on Monday.  If worsening pain fevers chills vomiting or other symptoms present she will return for further evaluation and care.  I am going to give the patient a couple pain pills and cover her for possible UTI.  She feels comfortable with this plan.     I have reviewed the nursing notes.    I have reviewed the findings, diagnosis, plan and need for follow up with the patient.       Discharge Medication List as of 8/28/2020  9:18 AM      START taking these medications    Details   ciprofloxacin (CIPRO) 500 MG tablet Take 1 tablet (500 mg) by mouth 2 times daily for 3 days, Disp-6 tablet,R-0, Local Print      oxyCODONE-acetaminophen (PERCOCET) 5-325 MG tablet Take 1-2 tablets by mouth every 4 hours as needed for breakthrough pain, Disp-2 tablet,R-0, Local Print             Final diagnoses:   Right-sided low back pain with right-sided sciatica, unspecified chronicity   Endometriosis - history of   Acute UTI - possible       8/28/2020   [facility] EMERGENCY DEPARTMENT     Kodak Arellano MD  08/31/20 [id number]

## 2020-08-28 NOTE — ED TRIAGE NOTES
Right sided endometritis pain. Reports she can't keep food or meds down. Burning with bowel movements x2. Symptoms started two days ago. Period started 2 days ago.

## 2020-08-28 NOTE — ED AVS SNAPSHOT
[facility] Emergency Department  [address] [address]  Phone:  [phone]  Fax:  [phone]                                    Winifred Ibarra   MRN: [id number]    Department:  [facility] Emergency Department   Date of Visit:  8/28/2020           After Visit Summary Signature Page    I have received my discharge instructions, and my questions have been answered. I have discussed any challenges I see with this plan with the nurse or doctor.    ..........................................................................................................................................  Patient/Patient Representative Signature      ..........................................................................................................................................  Patient Representative Print Name and Relationship to Patient    ..................................................               ................................................  Date                                   Time    ..........................................................................................................................................  Reviewed by Signature/Title    ...................................................              ..............................................  Date                                               Time          22EPIC Rev 08/18

## 2020-08-28 NOTE — DISCHARGE INSTRUCTIONS
Return if symptoms worsen or new symptoms develop.  Follow-up with primary care physician next available.  Drink plenty of fluids.  Take antibiotics as directed.  If increased pain or other symptoms present please return for further evaluation and care take ibuprofen and Tylenol for pain.  Discussed with a primary care set up for possible injection at the sacroiliac joint.  For severe pain take Percocet as directed.  Try ice and heating pad.  If any worsening abdominal pain back pain numbness weakness any extremity or other symptoms present please return for further evaluation and care.

## 2020-08-28 NOTE — RESULT ENCOUNTER NOTE
Emergency Dept/Urgent Care discharge antibiotic (if prescribed): Ciprofloxacin (Cipro) 500 mg tablet, 1 tablet (500 mg) by mouth 2 times daily for 3 days.  Date of Rx (if applicable):  8/28/20  No changes in treatment per Urine culture protocol.

## 2020-08-29 LAB
BACTERIA SPEC CULT: NO GROWTH
Lab: NORMAL
SPECIMEN SOURCE: NORMAL

## 2020-08-29 NOTE — RESULT ENCOUNTER NOTE
Final urine culture report is NEGATIVE per [facility] Lab Result protocol.    If NEGATIVE result, no change in treatment, per [facility] Lab Result protocol.

## 2020-10-14 ENCOUNTER — HOSPITAL ENCOUNTER (EMERGENCY)
Facility: CLINIC | Age: 30
Discharge: HOME | End: 2020-10-14
Attending: PHYSICIAN ASSISTANT | Admitting: PHYSICIAN ASSISTANT
Payer: MEDICARE

## 2020-10-14 VITALS
HEIGHT: 66 IN | OXYGEN SATURATION: 100 % | SYSTOLIC BLOOD PRESSURE: 117 MMHG | TEMPERATURE: 98.5 F | DIASTOLIC BLOOD PRESSURE: 71 MMHG | BODY MASS INDEX: 47.09 KG/M2 | HEART RATE: 67 BPM | WEIGHT: 293 LBS

## 2020-10-14 DIAGNOSIS — M79.604 RIGHT LEG PAIN: ICD-10-CM

## 2020-10-14 DIAGNOSIS — N80.9 ENDOMETRIOSIS: ICD-10-CM

## 2020-10-14 LAB
ALBUMIN SERPL-MCNC: 3.7 G/DL (ref 3.4–5)
ALBUMIN UR-MCNC: NEGATIVE MG/DL
ALP SERPL-CCNC: 66 U/L (ref 40–150)
ALT SERPL W P-5'-P-CCNC: 21 U/L (ref 0–50)
ANION GAP SERPL CALCULATED.3IONS-SCNC: 3 MMOL/L (ref 3–14)
APPEARANCE UR: ABNORMAL
AST SERPL W P-5'-P-CCNC: 15 U/L (ref 0–45)
BASOPHILS # BLD AUTO: 0 10E9/L (ref 0–0.2)
BASOPHILS NFR BLD AUTO: 0.6 %
BILIRUB SERPL-MCNC: 0.2 MG/DL (ref 0.2–1.3)
BILIRUB UR QL STRIP: NEGATIVE
BUN SERPL-MCNC: 16 MG/DL (ref 7–30)
CALCIUM SERPL-MCNC: 8.6 MG/DL (ref 8.5–10.1)
CHLORIDE SERPL-SCNC: 112 MMOL/L (ref 94–109)
CO2 SERPL-SCNC: 26 MMOL/L (ref 20–32)
COLOR UR AUTO: YELLOW
CREAT SERPL-MCNC: 0.9 MG/DL (ref 0.52–1.04)
DIFFERENTIAL METHOD BLD: ABNORMAL
EOSINOPHIL # BLD AUTO: 0.1 10E9/L (ref 0–0.7)
EOSINOPHIL NFR BLD AUTO: 0.9 %
ERYTHROCYTE [DISTWIDTH] IN BLOOD BY AUTOMATED COUNT: 13.1 % (ref 10–15)
GFR SERPL CREATININE-BSD FRML MDRD: 86 ML/MIN/{1.73_M2}
GLUCOSE SERPL-MCNC: 83 MG/DL (ref 70–99)
GLUCOSE UR STRIP-MCNC: NEGATIVE MG/DL
HCG UR QL: NEGATIVE
HCT VFR BLD AUTO: 36 % (ref 35–47)
HGB BLD-MCNC: 11.7 G/DL (ref 11.7–15.7)
HGB UR QL STRIP: ABNORMAL
IMM GRANULOCYTES # BLD: 0 10E9/L (ref 0–0.4)
IMM GRANULOCYTES NFR BLD: 0.2 %
KETONES UR STRIP-MCNC: NEGATIVE MG/DL
LEUKOCYTE ESTERASE UR QL STRIP: ABNORMAL
LYMPHOCYTES # BLD AUTO: 2.3 10E9/L (ref 0.8–5.3)
LYMPHOCYTES NFR BLD AUTO: 43 %
MCH RBC QN AUTO: 32.1 PG (ref 26.5–33)
MCHC RBC AUTO-ENTMCNC: 32.5 G/DL (ref 31.5–36.5)
MCV RBC AUTO: 99 FL (ref 78–100)
MONOCYTES # BLD AUTO: 0.7 10E9/L (ref 0–1.3)
MONOCYTES NFR BLD AUTO: 12.3 %
MUCOUS THREADS #/AREA URNS LPF: PRESENT /LPF
NEUTROPHILS # BLD AUTO: 2.3 10E9/L (ref 1.6–8.3)
NEUTROPHILS NFR BLD AUTO: 43 %
NITRATE UR QL: NEGATIVE
NRBC # BLD AUTO: 0 10*3/UL
NRBC BLD AUTO-RTO: 0 /100
PH UR STRIP: 6 PH (ref 5–7)
PLATELET # BLD AUTO: 251 10E9/L (ref 150–450)
POTASSIUM SERPL-SCNC: 3.8 MMOL/L (ref 3.4–5.3)
PROT SERPL-MCNC: 7.8 G/DL (ref 6.8–8.8)
RBC # BLD AUTO: 3.65 10E12/L (ref 3.8–5.2)
RBC #/AREA URNS AUTO: >182 /HPF (ref 0–2)
SODIUM SERPL-SCNC: 141 MMOL/L (ref 133–144)
SOURCE: ABNORMAL
SP GR UR STRIP: 1.02 (ref 1–1.03)
SQUAMOUS #/AREA URNS AUTO: 1 /HPF (ref 0–1)
UROBILINOGEN UR STRIP-MCNC: 2 MG/DL (ref 0–2)
WBC # BLD AUTO: 5.4 10E9/L (ref 4–11)
WBC #/AREA URNS AUTO: 2 /HPF (ref 0–5)

## 2020-10-14 PROCEDURE — 99285 EMERGENCY DEPT VISIT HI MDM: CPT | Performed by: PHYSICIAN ASSISTANT

## 2020-10-14 PROCEDURE — 96374 THER/PROPH/DIAG INJ IV PUSH: CPT | Performed by: PHYSICIAN ASSISTANT

## 2020-10-14 PROCEDURE — 81025 URINE PREGNANCY TEST: CPT | Performed by: PHYSICIAN ASSISTANT

## 2020-10-14 PROCEDURE — 81001 URINALYSIS AUTO W/SCOPE: CPT | Performed by: PHYSICIAN ASSISTANT

## 2020-10-14 PROCEDURE — 96376 TX/PRO/DX INJ SAME DRUG ADON: CPT | Performed by: PHYSICIAN ASSISTANT

## 2020-10-14 PROCEDURE — 258N000003 HC RX IP 258 OP 636: Performed by: PHYSICIAN ASSISTANT

## 2020-10-14 PROCEDURE — 96375 TX/PRO/DX INJ NEW DRUG ADDON: CPT | Performed by: PHYSICIAN ASSISTANT

## 2020-10-14 PROCEDURE — 87086 URINE CULTURE/COLONY COUNT: CPT | Performed by: PHYSICIAN ASSISTANT

## 2020-10-14 PROCEDURE — 96361 HYDRATE IV INFUSION ADD-ON: CPT | Performed by: PHYSICIAN ASSISTANT

## 2020-10-14 PROCEDURE — 85025 COMPLETE CBC W/AUTO DIFF WBC: CPT | Performed by: PHYSICIAN ASSISTANT

## 2020-10-14 PROCEDURE — 80053 COMPREHEN METABOLIC PANEL: CPT | Performed by: PHYSICIAN ASSISTANT

## 2020-10-14 PROCEDURE — 250N000011 HC RX IP 250 OP 636: Performed by: PHYSICIAN ASSISTANT

## 2020-10-14 PROCEDURE — 99285 EMERGENCY DEPT VISIT HI MDM: CPT | Mod: 25 | Performed by: PHYSICIAN ASSISTANT

## 2020-10-14 RX ORDER — ONDANSETRON 2 MG/ML
4 INJECTION INTRAMUSCULAR; INTRAVENOUS EVERY 30 MIN PRN
Status: DISCONTINUED | OUTPATIENT
Start: 2020-10-14 | End: 2020-10-14 | Stop reason: HOSPADM

## 2020-10-14 RX ORDER — HYDROMORPHONE HYDROCHLORIDE 1 MG/ML
0.5 INJECTION, SOLUTION INTRAMUSCULAR; INTRAVENOUS; SUBCUTANEOUS
Status: COMPLETED | OUTPATIENT
Start: 2020-10-14 | End: 2020-10-14

## 2020-10-14 RX ORDER — SODIUM CHLORIDE 9 MG/ML
INJECTION, SOLUTION INTRAVENOUS CONTINUOUS
Status: DISCONTINUED | OUTPATIENT
Start: 2020-10-14 | End: 2020-10-14 | Stop reason: HOSPADM

## 2020-10-14 RX ORDER — KETOROLAC TROMETHAMINE 15 MG/ML
15 INJECTION, SOLUTION INTRAMUSCULAR; INTRAVENOUS ONCE
Status: COMPLETED | OUTPATIENT
Start: 2020-10-14 | End: 2020-10-14

## 2020-10-14 RX ORDER — HYDROCODONE BITARTRATE AND ACETAMINOPHEN 5; 325 MG/1; MG/1
1 TABLET ORAL EVERY 6 HOURS PRN
Qty: 10 TABLET | Refills: 0 | Status: SHIPPED | OUTPATIENT
Start: 2020-10-14 | End: 2020-10-17

## 2020-10-14 RX ADMIN — SODIUM CHLORIDE 1000 ML: 9 INJECTION, SOLUTION INTRAVENOUS at 17:52

## 2020-10-14 RX ADMIN — ONDANSETRON 4 MG: 2 INJECTION INTRAMUSCULAR; INTRAVENOUS at 17:52

## 2020-10-14 RX ADMIN — HYDROMORPHONE HYDROCHLORIDE 0.5 MG: 1 INJECTION, SOLUTION INTRAMUSCULAR; INTRAVENOUS; SUBCUTANEOUS at 18:48

## 2020-10-14 RX ADMIN — HYDROMORPHONE HYDROCHLORIDE 0.5 MG: 1 INJECTION, SOLUTION INTRAMUSCULAR; INTRAVENOUS; SUBCUTANEOUS at 17:53

## 2020-10-14 RX ADMIN — KETOROLAC TROMETHAMINE 15 MG: 15 INJECTION, SOLUTION INTRAMUSCULAR; INTRAVENOUS at 17:53

## 2020-10-14 ASSESSMENT — MIFFLIN-ST. JEOR: SCORE: 2079.86

## 2020-10-14 NOTE — ED PROVIDER NOTES
"  History     Chief Complaint   Patient presents with     Back Pain     rt side back pain into thigh.  pt states \"it is my endometriosis\"     HPI  Winifred Ibarra is a 30 year old female with history of bipolar disorder, endometriosis, and chronic back pain who presents with complaints of right posterior proximal leg pain for the past 3 days.  Patient also complains of associated nausea and vomiting.  Patient reports having a history of chronic endometriosis and develops similar pain in her upper leg and buttocks as well as to her right lumbar back region monthly.  She states she is currently menstruating and is experiencing her typical endometriosis pain.  Patient does report that this pain is somewhat different and that she is not concurrently experiencing her right-sided low back pain as she usually does.  The pain is mainly isolated to her right buttocks and right posterior upper leg.  Her leg pain is worse with movement and walking.  Patient is followed at [facility] and has had prior surgery to remove the endometriosis as well as having tried hormone therapy and multiple medications in the past without improvement.  Denies saddle anesthesia, bowel or bladder incontinence, or lower extremity numbness/tingling/weakness.  Gait is steady but guarded.  Denies fevers, chills, abdominal pain, urinary symptoms, or leg swelling.  Patient most recently saw her gynecologist on 9/4/2020 through [facility].        I reviewed past medical records and pertinent information is copied below:  -The patient has had a previous diagnostic laparoscopy in March of 2019 followed by an excision surgery with Dr. Bryson in September of 2019 that initially gave her relief of her symptoms were around six months with recurrence of pain soon after.  -She has been seen by Neurology and has had an MRI of the back that reveals no spinal canal or neuronoforaminal compromise.   -Neurology has signed off on her and she is currently working with " pelvic floor therapy. According to her pelvic floor physical therapy has improved some of her urinary dysfunction however overwhelmingly she continues to have the right-sided pain that is leading to significant dysfunction.  -Her MRI reveals clustered small foci of T1 hyperintensity with soft tissue thickening and enhancement along the right uterosacral ligament which could be concerning for endometriosis. Additionally there was a 1.9 cm hemorrhagic cyst or possibly endometriotic cyst on the left ovary and a possibility of a very small endometrial polyp.  -We did discuss the results of the MRI and though I do think that laparoscopy could help with some part of her pain, the surgery may not be useful at all for her primary concerns which is the buttock pain.    I will discuss this MRI with my colleague Dr. Finley in Radiology to see if there is any involvement of sciatic nerve root with any disease and to see if any further imaging would be needed.    I have hence recommended a return follow-up with me in the clinic in about a month at which point we will have further discussion to see how we could help her more. In my opinion the patient would benefit most from Pain Medicine consult and for potentially targeted blocks to see if that would help with any of her symptoms.           Allergies:  Allergies   Allergen Reactions     Gold      Ibuprofen Nausea and Vomiting     Silver      Sodium Hypochlorite      Bleach         Problem List:    Patient Active Problem List    Diagnosis Date Noted     Muscle spasm 08/28/2020     Priority: Medium     Pelvic and perineal pain 07/09/2020     Priority: Medium     Endometriosis 04/09/2019     Priority: Medium     Added automatically from request for surgery [id number]  Added automatically from request for surgery [id number]  Added automatically from request for surgery [id number]  Added automatically from request for surgery [id number]       Hx pulmonary embolism 04/08/2019     Priority: Medium  "    Per patient report in [place], states negative workup for provoking factors, stopped anticoagulation use when moved to [place] not by medical advise  Per patient report in [place], states negative workup for provoking factors, stopped anticoagulation use when moved to [place] not by medical advise  Per patient report in [place], states negative workup for provoking factors, stopped anticoagulation use when moved to [place] not by medical advise  Per patient report in [place], states negative workup for provoking factors, stopped anticoagulation use when moved to [place] not by medical advise       Dysmenorrhea 05/28/2018     Priority: Medium     Acute stress reaction 12/12/2015     Priority: Medium     Partner \"put me out\" on Wednesday, 12/09/15.       Bipolar disorder, curr episode depressed, severe, w/psychotic features (H) 12/12/2015     Priority: Medium     H/o \"getting the shot\" (deconoate medication?), previous hospitalization in [place] (4 years ago when pt 22 yo).       History of being obese 12/12/2015     Priority: Medium     Suicidal ideations 12/12/2015     Priority: Medium     Bipolar affective disorder, currently depressed, moderate (H) 05/27/2015     Priority: Medium     Depression with anxiety 05/27/2015     Priority: Medium     OCD (obsessive compulsive disorder) 05/27/2015     Priority: Medium     PTSD (post-traumatic stress disorder) 05/27/2015     Priority: Medium     Encounter for long-term (current) use of medications 05/27/2015     Priority: Medium     Encounter for long-term (current) use of other medications       History of DVT (deep vein thrombosis) 05/27/2015     Priority: Medium     History of DVT (deep vein thrombosis) - per patient recall  History of DVT (deep vein thrombosis) - per patient recall  History of DVT (deep vein thrombosis) - per patient recall  History of DVT (deep vein thrombosis) - per patient recall       Panic attacks 05/27/2015     Priority: Medium     Social phobia 05/27/2015     " "Priority: Medium     Substance abuse in remission (H) 05/27/2015     Priority: Medium        Past Medical History:    Past Medical History:   Diagnosis Date     Anxiety      Bipolar depression (H)      Chronic abdominal pain      Chronic back pain      Chronic insomnia      Depressive disorder      DVT (deep vein thrombosis) in pregnancy      Endometriosis      Ovarian cyst      PE (pulmonary thromboembolism) (H)        Past Surgical History:    Past Surgical History:   Procedure Laterality Date     DAVINCI ASSISTED ABLATION / EXCISION OF ENDOMETRIOSIS         Family History:    No family history on file.    Social History:  Marital Status:  Single [1]  Social History     Tobacco Use     Smoking status: Not on file   Substance Use Topics     Alcohol use: Not on file     Drug use: Not on file        Medications:         HYDROcodone-acetaminophen (NORCO) 5-325 MG tablet       celecoxib (CELEBREX) 200 MG capsule       medical cannabis (Patient's own supply)       methocarbamol (ROBAXIN) 500 MG tablet       ondansetron (ZOFRAN ODT) 4 MG ODT tab          Review of Systems   Constitutional: Negative.  Negative for fever.   Gastrointestinal: Positive for nausea and vomiting. Negative for abdominal pain.   Genitourinary: Negative.    Musculoskeletal:        Right leg pain   Skin: Negative.    All other systems reviewed and are negative.      Physical Exam   BP: 126/71  Pulse: 78  Temp: 98.5  F (36.9  C)  Height: 167.6 cm (5' 6\")  Weight: 133.8 kg (295 lb)  SpO2: 100 %      Physical Exam  Constitutional:       General: She is in acute distress.      Appearance: Normal appearance. She is not ill-appearing, toxic-appearing or diaphoretic.   HENT:      Head: Normocephalic and atraumatic.      Mouth/Throat:      Mouth: Mucous membranes are moist.   Eyes:      Extraocular Movements: Extraocular movements intact.      Conjunctiva/sclera: Conjunctivae normal.      Pupils: Pupils are equal, round, and reactive to light.   Neck:      " Musculoskeletal: Normal range of motion and neck supple.   Cardiovascular:      Rate and Rhythm: Normal rate and regular rhythm.      Heart sounds: Normal heart sounds.   Pulmonary:      Effort: Pulmonary effort is normal.      Breath sounds: Normal breath sounds.   Abdominal:      General: There is no distension.      Palpations: Abdomen is soft.      Tenderness: There is no abdominal tenderness. There is no right CVA tenderness, left CVA tenderness, guarding or rebound.   Musculoskeletal:      Cervical back: Normal. She exhibits normal range of motion, no tenderness and no bony tenderness.      Thoracic back: Normal. She exhibits normal range of motion, no tenderness and no bony tenderness.      Lumbar back: Normal. She exhibits normal range of motion, no tenderness and no bony tenderness.      Comments: Mild posterior right proximal upper leg tenderness to palpation.  Normal strength and sensation.  No skin changes.   Skin:     General: Skin is warm and dry.      Capillary Refill: Capillary refill takes less than 2 seconds.   Neurological:      General: No focal deficit present.      Mental Status: She is alert.      Sensory: Sensation is intact.      Motor: Motor function is intact.      Coordination: Coordination is intact.         ED Course        Procedures      Results for orders placed or performed during the hospital encounter of 10/14/20 (from the past 24 hour(s))   CBC with platelets differential   Result Value Ref Range    WBC 5.4 4.0 - 11.0 10e9/L    RBC Count 3.65 (L) 3.8 - 5.2 10e12/L    Hemoglobin 11.7 11.7 - 15.7 g/dL    Hematocrit 36.0 35.0 - 47.0 %    MCV 99 78 - 100 fl    MCH 32.1 26.5 - 33.0 pg    MCHC 32.5 31.5 - 36.5 g/dL    RDW 13.1 10.0 - 15.0 %    Platelet Count 251 150 - 450 10e9/L    Diff Method Automated Method     % Neutrophils 43.0 %    % Lymphocytes 43.0 %    % Monocytes 12.3 %    % Eosinophils 0.9 %    % Basophils 0.6 %    % Immature Granulocytes 0.2 %    Nucleated RBCs 0 0 /100     Absolute Neutrophil 2.3 1.6 - 8.3 10e9/L    Absolute Lymphocytes 2.3 0.8 - 5.3 10e9/L    Absolute Monocytes 0.7 0.0 - 1.3 10e9/L    Absolute Eosinophils 0.1 0.0 - 0.7 10e9/L    Absolute Basophils 0.0 0.0 - 0.2 10e9/L    Abs Immature Granulocytes 0.0 0 - 0.4 10e9/L    Absolute Nucleated RBC 0.0    Comprehensive metabolic panel   Result Value Ref Range    Sodium 141 133 - 144 mmol/L    Potassium 3.8 3.4 - 5.3 mmol/L    Chloride 112 (H) 94 - 109 mmol/L    Carbon Dioxide 26 20 - 32 mmol/L    Anion Gap 3 3 - 14 mmol/L    Glucose 83 70 - 99 mg/dL    Urea Nitrogen 16 7 - 30 mg/dL    Creatinine 0.90 0.52 - 1.04 mg/dL    GFR Estimate 86 >60 mL/min/[1.73_m2]    GFR Estimate If Black >90 >60 mL/min/[1.73_m2]    Calcium 8.6 8.5 - 10.1 mg/dL    Bilirubin Total 0.2 0.2 - 1.3 mg/dL    Albumin 3.7 3.4 - 5.0 g/dL    Protein Total 7.8 6.8 - 8.8 g/dL    Alkaline Phosphatase 66 40 - 150 U/L    ALT 21 0 - 50 U/L    AST 15 0 - 45 U/L   UA reflex to Microscopic   Result Value Ref Range    Color Urine Yellow     Appearance Urine Slightly Cloudy     Glucose Urine Negative NEG^Negative mg/dL    Bilirubin Urine Negative NEG^Negative    Ketones Urine Negative NEG^Negative mg/dL    Specific Gravity Urine 1.023 1.003 - 1.035    Blood Urine Large (A) NEG^Negative    pH Urine 6.0 5.0 - 7.0 pH    Protein Albumin Urine Negative NEG^Negative mg/dL    Urobilinogen mg/dL 2.0 0.0 - 2.0 mg/dL    Nitrite Urine Negative NEG^Negative    Leukocyte Esterase Urine Trace (A) NEG^Negative    Source Midstream Urine     RBC Urine >182 (H) 0 - 2 /HPF    WBC Urine 2 0 - 5 /HPF    Squamous Epithelial /HPF Urine 1 0 - 1 /HPF    Mucous Urine Present (A) NEG^Negative /LPF   HCG qualitative urine (UPT)   Result Value Ref Range    HCG Qual Urine Negative NEG^Negative   Urine Culture    Specimen: Midstream Urine   Result Value Ref Range    Specimen Description Midstream Urine     Special Requests Specimen received in preservative     Culture Micro PENDING         Medications   0.9% sodium chloride BOLUS (0 mLs Intravenous Stopped 10/14/20 1933)   ketorolac (TORADOL) injection 15 mg (15 mg Intravenous Given 10/14/20 1753)   HYDROmorphone (PF) (DILAUDID) injection 0.5 mg (0.5 mg Intravenous Given 10/14/20 1753)   HYDROmorphone (PF) (DILAUDID) injection 0.5 mg (0.5 mg Intravenous Given 10/14/20 1848)       Assessments & Plan (with Medical Decision Making)     Pt is a 30 year old female with history of bipolar disorder, endometriosis, and chronic back pain who presents with complaints of right posterior proximal leg pain for the past 3 days.  Patient also complains of associated nausea and vomiting.  Patient reports having a history of chronic endometriosis and develops similar pain in her upper leg and buttocks as well as to her right lumbar back region monthly.  She states she is currently menstruating and is experiencing her typical endometriosis pain.  Patient does report that this pain is somewhat different and that she is not concurrently experiencing her right-sided low back pain as she usually does.  The pain is mainly isolated to her right buttocks and right posterior upper leg.  Her leg pain is worse with movement and walking.  Patient is followed at [facility] and has had prior surgery to remove the endometriosis as well as having tried hormone therapy and multiple medications in the past without improvement.  Patient most recently saw her gynecologist on 9/4/2020 through [facility].  She has had normal lumbar spine MRI in the past.    Pt is afebrile on arrival.  Exam as above.  No leukocytosis on CBC.  Comprehensive metabolic panel is unremarkable.  Urinalysis was negative for infection.  Patient is currently menstruating.  UPT was negative.  Discussed results with patient.  We discussed obtaining further imaging today however patient states that her symptoms are typical of her past exacerbations of endometriosis and she does not feel the need to repeat  imaging studies today.  Patient was given fluids, Toradol, and Dilaudid for pain.  She is feeling improved on reevaluation.  Encouraged continued symptomatic treatments at home and close follow-up with her gynecologist.  Return precautions were reviewed.  Hand-outs were provided.    Patient was sent with [facility] and was instructed to follow-up with her gynecologist in 3-5 days for continued care and management or sooner if new or worsening symptoms.  She is to return to the ED for persistent and/or worsening symptoms.  Patient expressed understanding of the diagnosis and plan and was discharged home in good condition.    I have reviewed the nursing notes.    I have reviewed the findings, diagnosis, plan and need for follow up with the patient.    Discharge Medication List as of 10/14/2020  7:34 PM      START taking these medications    Details   HYDROcodone-acetaminophen (NORCO) 5-325 MG tablet Take 1 tablet by mouth every 6 hours as needed for severe pain, Disp-10 tablet, R-0, E-Prescribe             Final diagnoses:   Right leg pain   Endometriosis       10/14/2020   [facility] EMERGENCY DEPT      Disclaimer:  This note consists of symbols derived from keyboarding, dictation and/or voice recognition software.  As a result, there may be errors in the script that have gone undetected.  Please consider this when interpreting information found in this chart.     Jhoana Antony PA-C  10/15/20 0023

## 2020-10-14 NOTE — ED NOTES
Lower back pain that radiates down right leg.  Patient stated that she gets this pain every month when she has her menstrual cycle.  Patient is taking methocarbamol and another med (patient can't remember the name of) without improvement.

## 2020-10-14 NOTE — ED AVS SNAPSHOT
[facility] Emergency Dept  [address] [address]  Phone: [phone]  Fax: [phone]                                    Winifred Ibarra   MRN: [id number]    Department: [facility] Emergency Dept   Date of Visit: 10/14/2020           After Visit Summary Signature Page    I have received my discharge instructions, and my questions have been answered. I have discussed any challenges I see with this plan with the nurse or doctor.    ..........................................................................................................................................  Patient/Patient Representative Signature      ..........................................................................................................................................  Patient Representative Print Name and Relationship to Patient    ..................................................               ................................................  Date                                   Time    ..........................................................................................................................................  Reviewed by Signature/Title    ...................................................              ..............................................  Date                                               Time          22EPIC Rev 08/18

## 2020-10-15 ENCOUNTER — HOSPITAL ENCOUNTER (EMERGENCY)
Facility: CLINIC | Age: 30
Discharge: HOME | End: 2020-10-15
Attending: NURSE PRACTITIONER | Admitting: NURSE PRACTITIONER
Payer: MEDICARE

## 2020-10-15 ENCOUNTER — APPOINTMENT (OUTPATIENT)
Dept: ULTRASOUND IMAGING | Facility: CLINIC | Age: 30
End: 2020-10-15
Attending: NURSE PRACTITIONER
Payer: MEDICARE

## 2020-10-15 VITALS
TEMPERATURE: 98 F | WEIGHT: 293 LBS | BODY MASS INDEX: 47.61 KG/M2 | SYSTOLIC BLOOD PRESSURE: 114 MMHG | RESPIRATION RATE: 16 BRPM | DIASTOLIC BLOOD PRESSURE: 88 MMHG | OXYGEN SATURATION: 100 % | HEART RATE: 98 BPM

## 2020-10-15 DIAGNOSIS — N80.9 ENDOMETRIOSIS: ICD-10-CM

## 2020-10-15 DIAGNOSIS — N83.201 RUPTURE OF CYST OF RIGHT OVARY: ICD-10-CM

## 2020-10-15 DIAGNOSIS — M54.41 CHRONIC RIGHT-SIDED LOW BACK PAIN WITH RIGHT-SIDED SCIATICA: ICD-10-CM

## 2020-10-15 DIAGNOSIS — G89.29 CHRONIC RIGHT-SIDED LOW BACK PAIN WITH RIGHT-SIDED SCIATICA: ICD-10-CM

## 2020-10-15 LAB
BACTERIA SPEC CULT: NORMAL
BASOPHILS # BLD AUTO: 0 10E9/L (ref 0–0.2)
BASOPHILS NFR BLD AUTO: 0.3 %
DIFFERENTIAL METHOD BLD: ABNORMAL
EOSINOPHIL # BLD AUTO: 0 10E9/L (ref 0–0.7)
EOSINOPHIL NFR BLD AUTO: 0.3 %
ERYTHROCYTE [DISTWIDTH] IN BLOOD BY AUTOMATED COUNT: 13.3 % (ref 10–15)
HCT VFR BLD AUTO: 34.2 % (ref 35–47)
HGB BLD-MCNC: 11.2 G/DL (ref 11.7–15.7)
IMM GRANULOCYTES # BLD: 0 10E9/L (ref 0–0.4)
IMM GRANULOCYTES NFR BLD: 0.3 %
LYMPHOCYTES # BLD AUTO: 0.9 10E9/L (ref 0.8–5.3)
LYMPHOCYTES NFR BLD AUTO: 15.5 %
Lab: NORMAL
MCH RBC QN AUTO: 32.5 PG (ref 26.5–33)
MCHC RBC AUTO-ENTMCNC: 32.7 G/DL (ref 31.5–36.5)
MCV RBC AUTO: 99 FL (ref 78–100)
MONOCYTES # BLD AUTO: 0.4 10E9/L (ref 0–1.3)
MONOCYTES NFR BLD AUTO: 6.1 %
NEUTROPHILS # BLD AUTO: 4.4 10E9/L (ref 1.6–8.3)
NEUTROPHILS NFR BLD AUTO: 77.5 %
NRBC # BLD AUTO: 0 10*3/UL
NRBC BLD AUTO-RTO: 0 /100
PLATELET # BLD AUTO: 223 10E9/L (ref 150–450)
RBC # BLD AUTO: 3.45 10E12/L (ref 3.8–5.2)
SPECIMEN SOURCE: NORMAL
WBC # BLD AUTO: 5.7 10E9/L (ref 4–11)

## 2020-10-15 PROCEDURE — 76830 TRANSVAGINAL US NON-OB: CPT

## 2020-10-15 PROCEDURE — 96361 HYDRATE IV INFUSION ADD-ON: CPT | Performed by: NURSE PRACTITIONER

## 2020-10-15 PROCEDURE — 96374 THER/PROPH/DIAG INJ IV PUSH: CPT | Performed by: NURSE PRACTITIONER

## 2020-10-15 PROCEDURE — 258N000003 HC RX IP 258 OP 636: Performed by: NURSE PRACTITIONER

## 2020-10-15 PROCEDURE — 85025 COMPLETE CBC W/AUTO DIFF WBC: CPT | Performed by: NURSE PRACTITIONER

## 2020-10-15 PROCEDURE — 99285 EMERGENCY DEPT VISIT HI MDM: CPT | Mod: 25 | Performed by: NURSE PRACTITIONER

## 2020-10-15 PROCEDURE — 250N000011 HC RX IP 250 OP 636: Performed by: NURSE PRACTITIONER

## 2020-10-15 PROCEDURE — 99285 EMERGENCY DEPT VISIT HI MDM: CPT | Performed by: NURSE PRACTITIONER

## 2020-10-15 PROCEDURE — 96375 TX/PRO/DX INJ NEW DRUG ADDON: CPT | Performed by: NURSE PRACTITIONER

## 2020-10-15 PROCEDURE — 96376 TX/PRO/DX INJ SAME DRUG ADON: CPT | Performed by: NURSE PRACTITIONER

## 2020-10-15 RX ORDER — SODIUM CHLORIDE 9 MG/ML
INJECTION, SOLUTION INTRAVENOUS CONTINUOUS
Status: DISCONTINUED | OUTPATIENT
Start: 2020-10-15 | End: 2020-10-15 | Stop reason: HOSPADM

## 2020-10-15 RX ORDER — HYDROMORPHONE HYDROCHLORIDE 1 MG/ML
0.5 INJECTION, SOLUTION INTRAMUSCULAR; INTRAVENOUS; SUBCUTANEOUS ONCE
Status: COMPLETED | OUTPATIENT
Start: 2020-10-15 | End: 2020-10-15

## 2020-10-15 RX ORDER — OXYCODONE HYDROCHLORIDE 5 MG/1
5 TABLET ORAL EVERY 6 HOURS PRN
Qty: 12 TABLET | Refills: 0 | Status: SHIPPED | OUTPATIENT
Start: 2020-10-15 | End: 2020-11-22

## 2020-10-15 RX ORDER — ONDANSETRON 2 MG/ML
4 INJECTION INTRAMUSCULAR; INTRAVENOUS ONCE
Status: COMPLETED | OUTPATIENT
Start: 2020-10-15 | End: 2020-10-15

## 2020-10-15 RX ADMIN — HYDROMORPHONE HYDROCHLORIDE 0.5 MG: 1 INJECTION, SOLUTION INTRAMUSCULAR; INTRAVENOUS; SUBCUTANEOUS at 09:25

## 2020-10-15 RX ADMIN — HYDROMORPHONE HYDROCHLORIDE 0.5 MG: 1 INJECTION, SOLUTION INTRAMUSCULAR; INTRAVENOUS; SUBCUTANEOUS at 11:17

## 2020-10-15 RX ADMIN — SODIUM CHLORIDE 1000 ML: 9 INJECTION, SOLUTION INTRAVENOUS at 09:22

## 2020-10-15 RX ADMIN — HYDROMORPHONE HYDROCHLORIDE 0.5 MG: 1 INJECTION, SOLUTION INTRAMUSCULAR; INTRAVENOUS; SUBCUTANEOUS at 10:27

## 2020-10-15 RX ADMIN — ONDANSETRON 4 MG: 2 INJECTION INTRAMUSCULAR; INTRAVENOUS at 09:24

## 2020-10-15 ASSESSMENT — ENCOUNTER SYMPTOMS
FEVER: 0
NEUROLOGICAL NEGATIVE: 1
FEVER: 0
DIARRHEA: 0
SHORTNESS OF BREATH: 0
BACK PAIN: 1
NAUSEA: 1
NAUSEA: 1
CONSTITUTIONAL NEGATIVE: 1
CONSTIPATION: 0
ABDOMINAL PAIN: 0
VOMITING: 1
ABDOMINAL PAIN: 0
COUGH: 0
VOMITING: 1
CHILLS: 0
DYSURIA: 0

## 2020-10-15 NOTE — ED AVS SNAPSHOT
[facility] Emergency Dept  [address] [address]  Phone: [phone]  Fax: [phone]                                    Winifred Ibarra   MRN: [id number]    Department: [facility] Emergency Dept   Date of Visit: 10/15/2020           After Visit Summary Signature Page    I have received my discharge instructions, and my questions have been answered. I have discussed any challenges I see with this plan with the nurse or doctor.    ..........................................................................................................................................  Patient/Patient Representative Signature      ..........................................................................................................................................  Patient Representative Print Name and Relationship to Patient    ..................................................               ................................................  Date                                   Time    ..........................................................................................................................................  Reviewed by Signature/Title    ...................................................              ..............................................  Date                                               Time          22EPIC Rev 08/18

## 2020-10-15 NOTE — ED TRIAGE NOTES
Pt presents with heavy vag bleeding for 3-4 days, states she has endometrosis and is having to change her tampon hourly since 0200. Pt also reporting low back/sciatica pain rad into R leg. Pt was seen for this same issue yesterday. Has issues with chronic pain r/t her endometrosis. Pt doctors at [facility] for this and has an appointment with chronic pain in Nov.

## 2020-10-15 NOTE — ED PROVIDER NOTES
History     Chief Complaint   Patient presents with     Abdominal Pain     Pt presents with heavy vag bleeding for 3 days, states she has endometrosis and is having to change her tampon hourly since 0200. Pt also reporting low back/sciatica pain rad into R leg.     Vaginal Bleeding     Back Pain     HPI  Winifred Ibarra is a 30 year old female with history of bipolar disorder, endometriosis, and chronic back pain who presents with complaints of right posterior proximal leg pain for the past 3 days and today increased vaginal bleeding since 2am.   Started her menstrual cycle 4 days ago, but heavier than normal the last 5 hours. Changing tampon every hour. Seen here in the ED yesterday for her posterior right buttock and leg pain, normal labs, and improved with IV antiemetic and pain meds.  Patient reports having a history of chronic endometriosis and develops similar pain in her upper leg and buttocks as well as to her right lumbar back region monthly.  Pain isolated to her right buttock and radiates down her right posterior thigh. Her leg pain is worse with movement and walking.  Patient is followed at [facility] and has had prior surgery to remove the endometriosis as well as having tried hormone therapy and multiple medications in the past without improvement.  Denies saddle anesthesia, bowel or bladder incontinence, or lower extremity numbness/tingling/weakness.  Gait is steady but guarded.  Denies fevers, chills, abdominal pain, urinary symptoms, or leg swelling.  Patient most recently saw her gynecologist on 9/4/2020 through [facility].        I reviewed past medical records and pertinent information is copied below:  -The patient has had a previous diagnostic laparoscopy in March of 2019 followed by an excision surgery with Dr. Bryson in September of 2019 that initially gave her relief of her symptoms were around six months with recurrence of pain soon after.  -She has been seen by Neurology and has had an  MRI of the back that reveals no spinal canal or neuronoforaminal compromise.   -Neurology has signed off on her and she is currently working with pelvic floor therapy. According to her pelvic floor physical therapy has improved some of her urinary dysfunction however overwhelmingly she continues to have the right-sided pain that is leading to significant dysfunction.  -Her MRI reveals clustered small foci of T1 hyperintensity with soft tissue thickening and enhancement along the right uterosacral ligament which could be concerning for endometriosis. Additionally there was a 1.9 cm hemorrhagic cyst or possibly endometriotic cyst on the left ovary and a possibility of a very small endometrial polyp.  -We did discuss the results of the MRI and though I do think that laparoscopy could help with some part of her pain, the surgery may not be useful at all for her primary concerns which is the buttock pain.    I will discuss this MRI with my colleague Dr. Finley in Radiology to see if there is any involvement of sciatic nerve root with any disease and to see if any further imaging would be needed.    I have hence recommended a return follow-up with me in the clinic in about a month at which point we will have further discussion to see how we could help her more. In my opinion the patient would benefit most from Pain Medicine consult and for potentially targeted blocks to see if that would help with any of her symptoms.         MRI lumbar spine 7/10/2020:      MR Gynecologic Pelvis with and without contrast 7/8/2020:      Allergies:  Allergies   Allergen Reactions     Gold      Ibuprofen Nausea and Vomiting     Silver      Sodium Hypochlorite      Bleach         Problem List:    Patient Active Problem List    Diagnosis Date Noted     Muscle spasm 08/28/2020     Priority: Medium     Pelvic and perineal pain 07/09/2020     Priority: Medium     Endometriosis 04/09/2019     Priority: Medium     Added automatically from  "request for surgery [id number]  Added automatically from request for surgery [id number]  Added automatically from request for surgery [id number]  Added automatically from request for surgery [id number]       Hx pulmonary embolism 04/08/2019     Priority: Medium     Per patient report in [place], [facility] negative workup for provoking factors, stopped anticoagulation use when moved to [place] not by medical advise  Per patient report in [place], states negative workup for provoking factors, stopped anticoagulation use when moved to [place] not by medical advise  Per patient report in [place], states negative workup for provoking factors, stopped anticoagulation use when moved to [place] not by medical advise  Per patient report in [place], states negative workup for provoking factors, stopped anticoagulation use when moved to [place] not by medical advise       Dysmenorrhea 05/28/2018     Priority: Medium     Acute stress reaction 12/12/2015     Priority: Medium     Partner \"put me out\" on Wednesday, 12/09/15.       Bipolar disorder, curr episode depressed, severe, w/psychotic features (H) 12/12/2015     Priority: Medium     H/o \"getting the shot\" (deconoate medication?), previous hospitalization in [place] (4 years ago when pt 22 yo).       History of being obese 12/12/2015     Priority: Medium     Suicidal ideations 12/12/2015     Priority: Medium     Bipolar affective disorder, currently depressed, moderate (H) 05/27/2015     Priority: Medium     Depression with anxiety 05/27/2015     Priority: Medium     OCD (obsessive compulsive disorder) 05/27/2015     Priority: Medium     PTSD (post-traumatic stress disorder) 05/27/2015     Priority: Medium     Encounter for long-term (current) use of medications 05/27/2015     Priority: Medium     Encounter for long-term (current) use of other medications       History of DVT (deep vein thrombosis) 05/27/2015     Priority: Medium     History of DVT (deep vein thrombosis) - per patient recall  History of DVT " (deep vein thrombosis) - per patient recall  History of DVT (deep vein thrombosis) - per patient recall  History of DVT (deep vein thrombosis) - per patient recall       Panic attacks 05/27/2015     Priority: Medium     Social phobia 05/27/2015     Priority: Medium     Substance abuse in remission (H) 05/27/2015     Priority: Medium        Past Medical History:    Past Medical History:   Diagnosis Date     Anxiety      Bipolar depression (H)      Chronic abdominal pain      Chronic back pain      Chronic insomnia      Depressive disorder      DVT (deep vein thrombosis) in pregnancy      Endometriosis      Ovarian cyst      PE (pulmonary thromboembolism) (H)        Past Surgical History:    Past Surgical History:   Procedure Laterality Date     DAVINCI ASSISTED ABLATION / EXCISION OF ENDOMETRIOSIS         Family History:    No family history on file.    Social History:  Marital Status:  Single [1]  Social History     Tobacco Use     Smoking status: Not on file   Substance Use Topics     Alcohol use: Not on file     Drug use: Not on file        Medications:         oxyCODONE (ROXICODONE) 5 MG tablet       celecoxib (CELEBREX) 200 MG capsule       HYDROcodone-acetaminophen (NORCO) 5-325 MG tablet       medical cannabis (Patient's own supply)       methocarbamol (ROBAXIN) 500 MG tablet       ondansetron (ZOFRAN ODT) 4 MG ODT tab          Review of Systems   Constitutional: Negative for chills and fever.   HENT: Negative for congestion.    Respiratory: Negative for cough and shortness of breath.    Cardiovascular: Negative for chest pain and leg swelling.   Gastrointestinal: Positive for nausea and vomiting. Negative for abdominal pain, constipation and diarrhea.   Genitourinary: Positive for pelvic pain and vaginal bleeding. Negative for dysuria.   Musculoskeletal: Positive for back pain.   Skin: Negative.    Neurological: Negative.    All other systems reviewed and are negative.      Physical Exam   BP: (!)  129/90  Pulse: 84  Temp: 98  F (36.7  C)  Resp: 16  Weight: 133.8 kg (295 lb)  SpO2: 100 %      Physical Exam  Exam conducted with a chaperone present.   Constitutional:       General: She is in acute distress.      Appearance: She is well-developed. She is obese. She is not ill-appearing.   Cardiovascular:      Rate and Rhythm: Normal rate and regular rhythm.   Pulmonary:      Effort: Pulmonary effort is normal.      Breath sounds: Normal breath sounds.   Abdominal:      General: Bowel sounds are normal. There is no distension.      Palpations: Abdomen is soft.      Tenderness: There is no abdominal tenderness.   Genitourinary:     Exam position: Supine.      Vagina: Bleeding (scant amout of blood ) present.      Cervix: Normal.      Uterus: Normal.       Adnexa:         Right: Tenderness present. No mass or fullness.          Left: No mass, tenderness or fullness.     Musculoskeletal: Normal range of motion.      Lumbar back: She exhibits tenderness.        Back:         Legs:       Comments: Ambulating with cane, slow but steady gait.   Skin:     General: Skin is warm and dry.      Findings: No rash.   Neurological:      General: No focal deficit present.      Mental Status: She is alert and oriented to person, place, and time.         ED Course        Procedures                 Results for orders placed or performed during the hospital encounter of 10/15/20 (from the past 24 hour(s))   CBC with platelets differential   Result Value Ref Range    WBC 5.7 4.0 - 11.0 10e9/L    RBC Count 3.45 (L) 3.8 - 5.2 10e12/L    Hemoglobin 11.2 (L) 11.7 - 15.7 g/dL    Hematocrit 34.2 (L) 35.0 - 47.0 %    MCV 99 78 - 100 fl    MCH 32.5 26.5 - 33.0 pg    MCHC 32.7 31.5 - 36.5 g/dL    RDW 13.3 10.0 - 15.0 %    Platelet Count 223 150 - 450 10e9/L    Diff Method Automated Method     % Neutrophils 77.5 %    % Lymphocytes 15.5 %    % Monocytes 6.1 %    % Eosinophils 0.3 %    % Basophils 0.3 %    % Immature Granulocytes 0.3 %     Nucleated RBCs 0 0 /100    Absolute Neutrophil 4.4 1.6 - 8.3 10e9/L    Absolute Lymphocytes 0.9 0.8 - 5.3 10e9/L    Absolute Monocytes 0.4 0.0 - 1.3 10e9/L    Absolute Eosinophils 0.0 0.0 - 0.7 10e9/L    Absolute Basophils 0.0 0.0 - 0.2 10e9/L    Abs Immature Granulocytes 0.0 0 - 0.4 10e9/L    Absolute Nucleated RBC 0.0    US Pelvis Cmplt w Transvag & Doppler LmtPel Duplex Limited    Narrative    ULTRASOUND PELVIS COMPLETE WITH TRANSVAGINAL AND DOPPLER LIMITED  IMAGING 10/15/2020 11:19 AM     HISTORY: Vaginal bleeding. Right pelvic pain.    TECHNIQUE: Transvaginal imaging was added to the transabdominal scans.  Spectral Doppler and wave form analysis was also performed to evaluate  blood flow to the ovaries.    COMPARISON: CT of the abdomen and pelvis performed 6/8/2019.    FINDINGS: The uterus is measured at 8.3 x 4.3 x 5.2 cm. No fibroids  are evident. Endometrial stripe measures 1.5 cm and appears mildly  thickened for a premenopausal patient. An ovoid echogenic region  within the endometrium measures 0.9 x 0.6 x 1.2 cm, and could  represent an endometrial polyp. The left ovary is unremarkable. The  right ovary contains a complex cystic lesion measuring 3.3 cm, with an  appearance suggestive of a hemorrhagic ovarian cyst. Small amount of  anechoic free pelvic fluid is present. Spectral Doppler and waveform  analysis demonstrate arterial and venous flow to both ovaries.       Impression    IMPRESSION:   1. The endometrium is mildly thickened at 1.5 cm.  2. A 1.2 cm ovoid echogenic structure within the endometrium is  suspicious for an endometrial polyp.  3. A complex cystic lesion in the right ovary has an appearance  suggestive of a hemorrhagic ovarian cyst. Follow-up ultrasound in 6-12  weeks may be helpful to ensure resolution.   4. Small amount of free fluid in the pelvis is nonspecific, and may be  physiologic.  5. No convincing sonographic evidence for ovarian torsion.    YOAN BERNARDO MD        Medications   0.9% sodium chloride BOLUS (0 mLs Intravenous Stopped 10/15/20 1149)   HYDROmorphone (PF) (DILAUDID) injection 0.5 mg (0.5 mg Intravenous Given 10/15/20 0925)   ondansetron (ZOFRAN) injection 4 mg (4 mg Intravenous Given 10/15/20 0924)   HYDROmorphone (PF) (DILAUDID) injection 0.5 mg (0.5 mg Intravenous Given 10/15/20 1027)   HYDROmorphone (PF) (DILAUDID) injection 0.5 mg (0.5 mg Intravenous Given 10/15/20 1117)       Assessments & Plan (with Medical Decision Making)   Pt is a 30 year old female with history of endometriosis, and chronic back pain who presents with complaints of increased vaginal bleeding in the setting of current menstrual cycle. Chronic back pain with radiation into right buttock and posterior right leg.   Patient is followed at [facility].  Seen here yesterday for her pain with normal urine and labs.  Vaginal bleeding increased since 2am.  On exam patient is afebrile.  Normotensive.  No tachycardia.  Abdomen is soft and nontender.  Tenderness to the right buttock.  Pelvic exam reveals a scant amount of bleeding in the vaginal vault.  No evidence of blood clots.  No evidence of vaginal hemorrhage.  Right adnexal tenderness is noted.  Hemoglobin is 11.2, not significantly changed from yesterday at 11.7.  Pelvic ultrasound reveals mildly thickened endometrium.  A 1.2 cm ovoid echogenic structure in the endometrium which is suspicious for endometrial polyp.  There is a complex cystic lesion in the right ovary which has the appearance suggestive of a hemorrhagic ovarian cyst.  Small amount of free fluid in the pelvis which is nonspcific.  No evidence ovarian torsion. Please see full radiologist interpretation as noted above. Patient was given IV normal saline, IV Zofran, and IV Dilaudid.  Pain improved.  Nausea resolved.  I discussed the lab and imaging findings with patient.  Patient was instructed to call her OB/GYN at [place] for close follow-up and to review the ultrasound  regarding her endometriosis and endometrial polyp.    Plan as follows:  Call [facility] to discuss further management of your endometriosis.  Tylenol (Acetaminophen) 650 mg every 4-6 hours as needed for mild/moderate pain.  Ibuprofen 400-600 mg every 6-8 hours as needed for mild/moderate pain. Take with food. Stop if it is causing nausea or abdominal pain.   Add Oxycodone 5 mg every 6 hours if needed for moderate/severe pain. Do not use alcohol, operate machinery, drive, or climb on ladders for 8 hours after taking Oxycodone.   Use docusate (100mg) 2 times a day to prevent constipation while on opioid medication.    Return to the emergency department for fevers, increased pain, increased vaginal bleeding or worse in any way.            I have reviewed the nursing notes.    I have reviewed the findings, diagnosis, plan and need for follow up with the patient.      Discharge Medication List as of 10/15/2020 11:49 AM      START taking these medications    Details   oxyCODONE (ROXICODONE) 5 MG tablet Take 1 tablet (5 mg) by mouth every 6 hours as needed for moderate to severe pain, Disp-12 tablet, R-0, Local Print             Final diagnoses:   Rupture of cyst of right ovary   Endometriosis   Chronic right-sided low back pain with right-sided sciatica       10/15/2020   [facility] EMERGENCY DEPT     Kathryn, SEAN Montalvo CNP  10/15/20 7704

## 2020-10-15 NOTE — DISCHARGE INSTRUCTIONS
Call [facility] to discuss further management of your endometriosis.  Tylenol (Acetaminophen) 650 mg every 4-6 hours as needed for mild/moderate pain.  Ibuprofen 400-600 mg every 6-8 hours as needed for mild/moderate pain. Take with food. Stop if it is causing nausea or abdominal pain.   Add Oxycodone 5 mg every 6 hours if needed for moderate/severe pain. Do not use alcohol, operate machinery, drive, or climb on ladders for 8 hours after taking Oxycodone.   Use docusate (100mg) 2 times a day to prevent constipation while on opioid medication.    Return to the emergency department for fevers, increased pain, increased vaginal bleeding or worse in any way.

## 2020-10-15 NOTE — RESULT ENCOUNTER NOTE
Emergency Dept/Urgent Care discharge antibiotic (if prescribed): None  No changes in treatment per Urine culture protocol.  (Currently in ED being reevaluated)

## 2020-10-16 NOTE — RESULT ENCOUNTER NOTE
Final urine culture report is NEGATIVE per [place] ED Lab Result protocol.    If NEGATIVE result, no change in treatment, per [place] ED Lab Result protocol.

## 2020-11-08 ENCOUNTER — HOSPITAL ENCOUNTER (EMERGENCY)
Facility: CLINIC | Age: 30
Discharge: HOME | End: 2020-11-08
Attending: NURSE PRACTITIONER | Admitting: NURSE PRACTITIONER
Payer: MEDICARE

## 2020-11-08 VITALS
HEART RATE: 74 BPM | DIASTOLIC BLOOD PRESSURE: 67 MMHG | RESPIRATION RATE: 18 BRPM | BODY MASS INDEX: 41.14 KG/M2 | HEIGHT: 66 IN | OXYGEN SATURATION: 100 % | TEMPERATURE: 98.2 F | SYSTOLIC BLOOD PRESSURE: 119 MMHG | WEIGHT: 256 LBS

## 2020-11-08 DIAGNOSIS — Z20.822 ENCOUNTER FOR LABORATORY TESTING FOR COVID-19 VIRUS: ICD-10-CM

## 2020-11-08 PROCEDURE — U0003 INFECTIOUS AGENT DETECTION BY NUCLEIC ACID (DNA OR RNA); SEVERE ACUTE RESPIRATORY SYNDROME CORONAVIRUS 2 (SARS-COV-2) (CORONAVIRUS DISEASE [COVID-19]), AMPLIFIED PROBE TECHNIQUE, MAKING USE OF HIGH THROUGHPUT TECHNOLOGIES AS DESCRIBED BY CMS-2020-01-R: HCPCS | Performed by: NURSE PRACTITIONER

## 2020-11-08 PROCEDURE — G0463 HOSPITAL OUTPT CLINIC VISIT: HCPCS | Performed by: NURSE PRACTITIONER

## 2020-11-08 PROCEDURE — 99213 OFFICE O/P EST LOW 20 MIN: CPT | Performed by: NURSE PRACTITIONER

## 2020-11-08 PROCEDURE — C9803 HOPD COVID-19 SPEC COLLECT: HCPCS | Performed by: NURSE PRACTITIONER

## 2020-11-08 ASSESSMENT — MIFFLIN-ST. JEOR: SCORE: 1902.96

## 2020-11-08 NOTE — ED AVS SNAPSHOT
[facility] Emergency Dept  [address] [address]  Phone: [phone]  Fax: [phone]                                    Winifred Ibarra   MRN: [id number]    Department: [facility] Emergency Dept   Date of Visit: 11/8/2020           After Visit Summary Signature Page    I have received my discharge instructions, and my questions have been answered. I have discussed any challenges I see with this plan with the nurse or doctor.    ..........................................................................................................................................  Patient/Patient Representative Signature      ..........................................................................................................................................  Patient Representative Print Name and Relationship to Patient    ..................................................               ................................................  Date                                   Time    ..........................................................................................................................................  Reviewed by Signature/Title    ...................................................              ..............................................  Date                                               Time          22EPIC Rev 08/18

## 2020-11-08 NOTE — DISCHARGE INSTRUCTIONS
Quarantine at home until you know the results of the covid test.   Return to the emergency department for worsening fevers, chest pain, short of breath/difficulty breathing, or any new symptoms concern.

## 2020-11-09 LAB
SARS-COV-2 RNA SPEC QL NAA+PROBE: NOT DETECTED
SPECIMEN SOURCE: NORMAL

## 2020-11-09 NOTE — ED PROVIDER NOTES
"  History     Chief Complaint   Patient presents with     Suspected Covid     no symptoms, exposed to someone who tested positive to COVID 19, contact was about 9 days ago.     HPI  Winifred Ibarra is a 30 year old female who presents to urgent care requesting testing for COVID-19 virus infection.  Patient reports exposure to COVID-19 virus 9 days ago.  Patient denies fever, cough, congestion, shortness of breath, chest pain, diarrhea, or change in taste or smell.    Allergies:  Allergies   Allergen Reactions     Gold      Ibuprofen Nausea and Vomiting     Silver      Sodium Hypochlorite      Bleach         Problem List:    Patient Active Problem List    Diagnosis Date Noted     Muscle spasm 08/28/2020     Priority: Medium     Pelvic and perineal pain 07/09/2020     Priority: Medium     Endometriosis 04/09/2019     Priority: Medium     Added automatically from request for surgery [id number]  Added automatically from request for surgery [id number]  Added automatically from request for surgery [id number]  Added automatically from request for surgery [id number]       Hx pulmonary embolism 04/08/2019     Priority: Medium     Per patient report in [place], [facility] negative workup for provoking factors, stopped anticoagulation use when moved to [place] not by medical advise  Per patient report in [place], states negative workup for provoking factors, stopped anticoagulation use when moved to [place] not by medical advise  Per patient report in [place], states negative workup for provoking factors, stopped anticoagulation use when moved to [place] not by medical advise  Per patient report in [place], states negative workup for provoking factors, stopped anticoagulation use when moved to [place] not by medical advise       Dysmenorrhea 05/28/2018     Priority: Medium     Acute stress reaction 12/12/2015     Priority: Medium     Partner \"put me out\" on Wednesday, 12/09/15.       Bipolar disorder, curr episode depressed, severe, w/psychotic features (H) " "12/12/2015     Priority: Medium     H/o \"getting the shot\" (deconoate medication?), previous hospitalization in [place] (4 years ago when pt 20 yo).       History of being obese 12/12/2015     Priority: Medium     Suicidal ideations 12/12/2015     Priority: Medium     Bipolar affective disorder, currently depressed, moderate (H) 05/27/2015     Priority: Medium     Depression with anxiety 05/27/2015     Priority: Medium     OCD (obsessive compulsive disorder) 05/27/2015     Priority: Medium     PTSD (post-traumatic stress disorder) 05/27/2015     Priority: Medium     Encounter for long-term (current) use of medications 05/27/2015     Priority: Medium     Encounter for long-term (current) use of other medications       History of DVT (deep vein thrombosis) 05/27/2015     Priority: Medium     History of DVT (deep vein thrombosis) - per patient recall  History of DVT (deep vein thrombosis) - per patient recall  History of DVT (deep vein thrombosis) - per patient recall  History of DVT (deep vein thrombosis) - per patient recall       Panic attacks 05/27/2015     Priority: Medium     Social phobia 05/27/2015     Priority: Medium     Substance abuse in remission (H) 05/27/2015     Priority: Medium        Past Medical History:    Past Medical History:   Diagnosis Date     Anxiety      Bipolar depression (H)      Chronic abdominal pain      Chronic back pain      Chronic insomnia      Depressive disorder      DVT (deep vein thrombosis) in pregnancy      Endometriosis      Ovarian cyst      PE (pulmonary thromboembolism) (H)        Past Surgical History:    Past Surgical History:   Procedure Laterality Date     DAVINCI ASSISTED ABLATION / EXCISION OF ENDOMETRIOSIS         Family History:    No family history on file.    Social History:  Marital Status:  Single [1]  Social History     Tobacco Use     Smoking status: Not on file   Substance Use Topics     Alcohol use: Not on file     Drug use: Not on file        Medications:  " "       celecoxib (CELEBREX) 200 MG capsule       medical cannabis (Patient's own supply)       methocarbamol (ROBAXIN) 500 MG tablet       ondansetron (ZOFRAN ODT) 4 MG ODT tab       oxyCODONE (ROXICODONE) 5 MG tablet          Review of Systems  As mentioned above in the history present illness. All other systems were reviewed and are negative.    Physical Exam   BP: 119/67  Pulse: 74  Temp: 98.2  F (36.8  C)  Resp: 18  Height: 167.6 cm (5' 6\")  Weight: 116.1 kg (256 lb)  SpO2: 100 %      Physical Exam    GENERAL APPEARANCE: alert and oriented. NAD.   RESP: lungs clear to auscultation - no rales, rhonchi or wheezes  CV: regular rates and rhythm, normal S1 S2, no murmur noted      ED Course        Procedures               No results found for this or any previous visit (from the past 24 hour(s)).    Medications - No data to display    Assessments & Plan (with Medical Decision Making)   Quarantine at home until you know the results of the covid test.   Return to the emergency department for worsening fevers, chest pain, short of breath/difficulty breathing, or any new symptoms concern.  I have reviewed the nursing notes.    I have reviewed the findings, diagnosis, plan and need for follow up with the patient.      Discharge Medication List as of 11/8/2020  3:51 PM          Final diagnoses:   Encounter for laboratory testing for COVID-19 virus       11/8/2020   [facility] EMERGENCY DEPT     Miguelina Peralta APRN CNP  11/08/20 2058    "

## 2020-11-12 ENCOUNTER — HOSPITAL ENCOUNTER (EMERGENCY)
Facility: CLINIC | Age: 30
Discharge: HOME | End: 2020-11-12
Attending: FAMILY MEDICINE | Admitting: FAMILY MEDICINE
Payer: MEDICARE

## 2020-11-12 VITALS
WEIGHT: 260 LBS | RESPIRATION RATE: 18 BRPM | OXYGEN SATURATION: 100 % | DIASTOLIC BLOOD PRESSURE: 80 MMHG | HEART RATE: 59 BPM | BODY MASS INDEX: 41.78 KG/M2 | SYSTOLIC BLOOD PRESSURE: 133 MMHG | TEMPERATURE: 98.9 F | HEIGHT: 66 IN

## 2020-11-12 DIAGNOSIS — M79.18 CHRONIC BUTTOCK PAIN: ICD-10-CM

## 2020-11-12 DIAGNOSIS — R10.2 CHRONIC PELVIC PAIN IN FEMALE: ICD-10-CM

## 2020-11-12 DIAGNOSIS — G89.29 CHRONIC BUTTOCK PAIN: ICD-10-CM

## 2020-11-12 DIAGNOSIS — G89.29 CHRONIC PELVIC PAIN IN FEMALE: ICD-10-CM

## 2020-11-12 LAB
ALBUMIN SERPL-MCNC: 3.8 G/DL (ref 3.4–5)
ALP SERPL-CCNC: 70 U/L (ref 40–150)
ALT SERPL W P-5'-P-CCNC: 24 U/L (ref 0–50)
ANION GAP SERPL CALCULATED.3IONS-SCNC: 1 MMOL/L (ref 3–14)
AST SERPL W P-5'-P-CCNC: 17 U/L (ref 0–45)
BASOPHILS # BLD AUTO: 0 10E9/L (ref 0–0.2)
BASOPHILS NFR BLD AUTO: 0.5 %
BILIRUB SERPL-MCNC: 0.3 MG/DL (ref 0.2–1.3)
BUN SERPL-MCNC: 15 MG/DL (ref 7–30)
CALCIUM SERPL-MCNC: 8.9 MG/DL (ref 8.5–10.1)
CHLORIDE SERPL-SCNC: 112 MMOL/L (ref 94–109)
CO2 SERPL-SCNC: 27 MMOL/L (ref 20–32)
CREAT SERPL-MCNC: 0.88 MG/DL (ref 0.52–1.04)
DIFFERENTIAL METHOD BLD: NORMAL
EOSINOPHIL # BLD AUTO: 0 10E9/L (ref 0–0.7)
EOSINOPHIL NFR BLD AUTO: 0.5 %
ERYTHROCYTE [DISTWIDTH] IN BLOOD BY AUTOMATED COUNT: 13 % (ref 10–15)
GFR SERPL CREATININE-BSD FRML MDRD: 89 ML/MIN/{1.73_M2}
GLUCOSE SERPL-MCNC: 84 MG/DL (ref 70–99)
HCT VFR BLD AUTO: 37.6 % (ref 35–47)
HGB BLD-MCNC: 12.4 G/DL (ref 11.7–15.7)
IMM GRANULOCYTES # BLD: 0 10E9/L (ref 0–0.4)
IMM GRANULOCYTES NFR BLD: 0.2 %
LYMPHOCYTES # BLD AUTO: 1.5 10E9/L (ref 0.8–5.3)
LYMPHOCYTES NFR BLD AUTO: 35.8 %
MCH RBC QN AUTO: 32.3 PG (ref 26.5–33)
MCHC RBC AUTO-ENTMCNC: 33 G/DL (ref 31.5–36.5)
MCV RBC AUTO: 98 FL (ref 78–100)
MONOCYTES # BLD AUTO: 0.6 10E9/L (ref 0–1.3)
MONOCYTES NFR BLD AUTO: 13.4 %
NEUTROPHILS # BLD AUTO: 2 10E9/L (ref 1.6–8.3)
NEUTROPHILS NFR BLD AUTO: 49.6 %
NRBC # BLD AUTO: 0 10*3/UL
NRBC BLD AUTO-RTO: 0 /100
PLATELET # BLD AUTO: 255 10E9/L (ref 150–450)
POTASSIUM SERPL-SCNC: 4.4 MMOL/L (ref 3.4–5.3)
PROT SERPL-MCNC: 8 G/DL (ref 6.8–8.8)
RBC # BLD AUTO: 3.84 10E12/L (ref 3.8–5.2)
SODIUM SERPL-SCNC: 140 MMOL/L (ref 133–144)
WBC # BLD AUTO: 4.1 10E9/L (ref 4–11)

## 2020-11-12 PROCEDURE — 250N000011 HC RX IP 250 OP 636: Performed by: FAMILY MEDICINE

## 2020-11-12 PROCEDURE — 99285 EMERGENCY DEPT VISIT HI MDM: CPT | Mod: 25 | Performed by: FAMILY MEDICINE

## 2020-11-12 PROCEDURE — 99285 EMERGENCY DEPT VISIT HI MDM: CPT | Performed by: FAMILY MEDICINE

## 2020-11-12 PROCEDURE — 85025 COMPLETE CBC W/AUTO DIFF WBC: CPT | Performed by: FAMILY MEDICINE

## 2020-11-12 PROCEDURE — 96376 TX/PRO/DX INJ SAME DRUG ADON: CPT | Performed by: FAMILY MEDICINE

## 2020-11-12 PROCEDURE — 96375 TX/PRO/DX INJ NEW DRUG ADDON: CPT | Performed by: FAMILY MEDICINE

## 2020-11-12 PROCEDURE — 96374 THER/PROPH/DIAG INJ IV PUSH: CPT | Performed by: FAMILY MEDICINE

## 2020-11-12 PROCEDURE — 80053 COMPREHEN METABOLIC PANEL: CPT | Performed by: FAMILY MEDICINE

## 2020-11-12 RX ORDER — HYDROMORPHONE HYDROCHLORIDE 1 MG/ML
0.5 INJECTION, SOLUTION INTRAMUSCULAR; INTRAVENOUS; SUBCUTANEOUS
Status: DISCONTINUED | OUTPATIENT
Start: 2020-11-12 | End: 2020-11-12 | Stop reason: HOSPADM

## 2020-11-12 RX ORDER — LORAZEPAM 2 MG/ML
1 INJECTION INTRAMUSCULAR ONCE
Status: COMPLETED | OUTPATIENT
Start: 2020-11-12 | End: 2020-11-12

## 2020-11-12 RX ORDER — OXYCODONE HYDROCHLORIDE 5 MG/1
5 TABLET ORAL EVERY 6 HOURS PRN
Qty: 12 TABLET | Refills: 0 | Status: SHIPPED | OUTPATIENT
Start: 2020-11-12 | End: 2020-11-22

## 2020-11-12 RX ORDER — ONDANSETRON 2 MG/ML
4 INJECTION INTRAMUSCULAR; INTRAVENOUS ONCE
Status: COMPLETED | OUTPATIENT
Start: 2020-11-12 | End: 2020-11-12

## 2020-11-12 RX ADMIN — HYDROMORPHONE HYDROCHLORIDE 0.5 MG: 1 INJECTION, SOLUTION INTRAMUSCULAR; INTRAVENOUS; SUBCUTANEOUS at 13:37

## 2020-11-12 RX ADMIN — HYDROMORPHONE HYDROCHLORIDE 1 MG: 1 INJECTION, SOLUTION INTRAMUSCULAR; INTRAVENOUS; SUBCUTANEOUS at 11:46

## 2020-11-12 RX ADMIN — ONDANSETRON 4 MG: 2 INJECTION INTRAMUSCULAR; INTRAVENOUS at 11:50

## 2020-11-12 RX ADMIN — LORAZEPAM 1 MG: 2 INJECTION INTRAMUSCULAR; INTRAVENOUS at 12:59

## 2020-11-12 ASSESSMENT — MIFFLIN-ST. JEOR: SCORE: 1921.1

## 2020-11-12 NOTE — DISCHARGE INSTRUCTIONS
Use your diclofenac and misoprostol as previously.  Add acetaminophen 500 mg 1-2 pills up to every 4 hours if needed for pain.   You may add oxycodone 5 mg, 1-2 tablets up to every 6 hours if needed for pain.  Try to use this primarily only at night to help with sleep.    Do not use alcohol, operate machinery, drive, or climb on ladders for 8 hours after taking oxycodone. Use docusate (100mg) 2 times a day to prevent constipation while on narcotics.  Follow-up with your pain specialist for future recommendations.  Follow-up with your gynecologist at [place] for further recommendations.

## 2020-11-12 NOTE — ED PROVIDER NOTES
History     Chief Complaint   Patient presents with     Leg Pain     pt reports gets admitted every month due to endometriosis that causes right leg pain.       HPI  Winifred Ibarra is a 30 year old female who presents requesting exploratory laparotomy for endometriosis with removal of endometriomas.  She has a long history of endometriosis.  For at least the past year she has been having recurrent chronic abdominal pain which she said results in her being admitted to the hospital every month for pain control.  She has seen a gynecologist at [place], Dr. Egan, who was last for notes I reviewed in the medical record.  Gynecologist has question whether there has been endometrioma involvement of her nerve roots because she has radiating pain to the leg associated with this which is the most debilitating symptom.  Apparently the gynecologist in his notes indicates and told the patient that he would be assembling a team including neurosurgery and colorectal surgery and reviewing imaging with radiology to determine the best approach to addressing her problem.  They did indicate that she may be best served by pain management consultation.  They also indicated that surgery may not resolve her problem.  Patient denies that her symptoms have changed in any way.  She is simply tired of having them and wants endometriosis surgery today.  She is been using diclofenac and misoprostol Tylenol to manage the pain.  The pain has been severe enough that she says she is not able to eat and drink and take food and fluids.    Allergies:  Allergies   Allergen Reactions     Gold      Ibuprofen Nausea and Vomiting     Silver      Sodium Hypochlorite      Bleach         Problem List:    Patient Active Problem List    Diagnosis Date Noted     Muscle spasm 08/28/2020     Priority: Medium     Pelvic and perineal pain 07/09/2020     Priority: Medium     Endometriosis 04/09/2019     Priority: Medium     Added automatically from request for  "surgery [id number]  Added automatically from request for surgery [id number]  Added automatically from request for surgery [id number]  Added automatically from request for surgery [id number]       Hx pulmonary embolism 04/08/2019     Priority: Medium     Per patient report in [place] [place] negative workup for provoking factors, stopped anticoagulation use when moved to [place] not by medical advise  Per patient report in [place], states negative workup for provoking factors, stopped anticoagulation use when moved to [place] not by medical advise  Per patient report in [place], states negative workup for provoking factors, stopped anticoagulation use when moved to [place] not by medical advise  Per patient report in [place], states negative workup for provoking factors, stopped anticoagulation use when moved to [place] not by medical advise       Dysmenorrhea 05/28/2018     Priority: Medium     Acute stress reaction 12/12/2015     Priority: Medium     Partner \"put me out\" on Wednesday, 12/09/15.       Bipolar disorder, curr episode depressed, severe, w/psychotic features (H) 12/12/2015     Priority: Medium     H/o \"getting the shot\" (deconoate medication?), previous hospitalization in [place] (4 years ago when pt 20 yo).       History of being obese 12/12/2015     Priority: Medium     Suicidal ideations 12/12/2015     Priority: Medium     Bipolar affective disorder, currently depressed, moderate (H) 05/27/2015     Priority: Medium     Depression with anxiety 05/27/2015     Priority: Medium     OCD (obsessive compulsive disorder) 05/27/2015     Priority: Medium     PTSD (post-traumatic stress disorder) 05/27/2015     Priority: Medium     Encounter for long-term (current) use of medications 05/27/2015     Priority: Medium     Encounter for long-term (current) use of other medications       History of DVT (deep vein thrombosis) 05/27/2015     Priority: Medium     History of DVT (deep vein thrombosis) - per patient recall  History of DVT (deep vein " "thrombosis) - per patient recall  History of DVT (deep vein thrombosis) - per patient recall  History of DVT (deep vein thrombosis) - per patient recall       Panic attacks 05/27/2015     Priority: Medium     Social phobia 05/27/2015     Priority: Medium     Substance abuse in remission (H) 05/27/2015     Priority: Medium        Past Medical History:    Past Medical History:   Diagnosis Date     Anxiety      Bipolar depression (H)      Chronic abdominal pain      Chronic back pain      Chronic insomnia      Depressive disorder      DVT (deep vein thrombosis) in pregnancy      Endometriosis      Ovarian cyst      PE (pulmonary thromboembolism) (H)        Past Surgical History:    Past Surgical History:   Procedure Laterality Date     DAVINCI ASSISTED ABLATION / EXCISION OF ENDOMETRIOSIS         Family History:    No family history on file.    Social History:  Marital Status:  Single [1]  Social History     Tobacco Use     Smoking status: Not on file   Substance Use Topics     Alcohol use: Not on file     Drug use: Not on file        Medications:         oxyCODONE (ROXICODONE) 5 MG tablet       celecoxib (CELEBREX) 200 MG capsule       medical cannabis (Patient's own supply)       methocarbamol (ROBAXIN) 500 MG tablet       ondansetron (ZOFRAN ODT) 4 MG ODT tab       oxyCODONE (ROXICODONE) 5 MG tablet      Review of Systems    All other systems are reviewed and are negative    Physical Exam   BP: 126/77  Pulse: 79  Temp: 98.9  F (37.2  C)  Resp: 18  Height: 167.6 cm (5' 6\")  Weight: 117.9 kg (260 lb)  SpO2: 99 %      Physical Exam    Nursing note and vitals were reviewed.  Constitutional: Awake and alert, adequately nourished and developed appearing 30-year-old in severe discomfort, who does not appear acutely ill, and who answers questions appropriately and cooperates with examination.  HEENT: EOMI.   Neck: Freely mobile.  Cardiovascular: Cardiac examination reveals normal heart rate and regular rhythm without " murmur.  Pulmonary/Chest: Breathing is unlabored.  Breath sounds are clear and equal bilaterally.  There no retractions, tachypnea, rales, wheezes, or rhonchi.  Abdomen: Soft, nontender, no HSM or masses rebound or guarding.  Neurological: Alert, oriented, thought content logical, coherent   Skin: Warm, dry, no rashes.  Psychiatric: Affect tearful and emotionally distraught.      ED Course        Procedures               Critical Care time:  none               Results for orders placed or performed during the hospital encounter of 11/12/20 (from the past 24 hour(s))   CBC with platelets differential   Result Value Ref Range    WBC 4.1 4.0 - 11.0 10e9/L    RBC Count 3.84 3.8 - 5.2 10e12/L    Hemoglobin 12.4 11.7 - 15.7 g/dL    Hematocrit 37.6 35.0 - 47.0 %    MCV 98 78 - 100 fl    MCH 32.3 26.5 - 33.0 pg    MCHC 33.0 31.5 - 36.5 g/dL    RDW 13.0 10.0 - 15.0 %    Platelet Count 255 150 - 450 10e9/L    Diff Method Automated Method     % Neutrophils 49.6 %    % Lymphocytes 35.8 %    % Monocytes 13.4 %    % Eosinophils 0.5 %    % Basophils 0.5 %    % Immature Granulocytes 0.2 %    Nucleated RBCs 0 0 /100    Absolute Neutrophil 2.0 1.6 - 8.3 10e9/L    Absolute Lymphocytes 1.5 0.8 - 5.3 10e9/L    Absolute Monocytes 0.6 0.0 - 1.3 10e9/L    Absolute Eosinophils 0.0 0.0 - 0.7 10e9/L    Absolute Basophils 0.0 0.0 - 0.2 10e9/L    Abs Immature Granulocytes 0.0 0 - 0.4 10e9/L    Absolute Nucleated RBC 0.0    Comprehensive metabolic panel   Result Value Ref Range    Sodium 140 133 - 144 mmol/L    Potassium 4.4 3.4 - 5.3 mmol/L    Chloride 112 (H) 94 - 109 mmol/L    Carbon Dioxide 27 20 - 32 mmol/L    Anion Gap 1 (L) 3 - 14 mmol/L    Glucose 84 70 - 99 mg/dL    Urea Nitrogen 15 7 - 30 mg/dL    Creatinine 0.88 0.52 - 1.04 mg/dL    GFR Estimate 89 >60 mL/min/[1.73_m2]    GFR Estimate If Black >90 >60 mL/min/[1.73_m2]    Calcium 8.9 8.5 - 10.1 mg/dL    Bilirubin Total 0.3 0.2 - 1.3 mg/dL    Albumin 3.8 3.4 - 5.0 g/dL    Protein Total  8.0 6.8 - 8.8 g/dL    Alkaline Phosphatase 70 40 - 150 U/L    ALT 24 0 - 50 U/L    AST 17 0 - 45 U/L       Medications   HYDROmorphone (PF) (DILAUDID) injection 0.5 mg (0.5 mg Intravenous Given 11/12/20 1337)   HYDROmorphone (DILAUDID) injection 1 mg (1 mg Intravenous Given 11/12/20 1146)   ondansetron (ZOFRAN) injection 4 mg (4 mg Intravenous Given 11/12/20 1150)   LORazepam (ATIVAN) injection 1 mg (1 mg Intravenous Given 11/12/20 1259)       Assessments & Plan (with Medical Decision Making)     30-year-old female with chronic pelvic pain and chronic buttock pain possibly attributed to endometriosis presented with breakthrough pain.  She requested immediate laparoscopy and treatment of endometriosis.  I discussed her case with Dr. Neumann and gynecology.  We agree that such intervention would be an appropriate.  She is working with specialist at the [facility] who is coordinating treatment with neurosurgery and colorectal surgery as well as radiology.  Our facility has none of these resources available.  Her previous endometriosis surgery was done robotically.  We also do not have a robot.  For all these reasons intervention today would be inappropriate.  It is also highly likely that it will not change her symptoms.  We focused on treating her symptoms today and gave her medication for adequate pain control.  I have given her 12 tablets of oxycodone to add to her usual medication regimen.  She is working with a pain specialist in [place] and I advised her to return there for further recommendations.  She has not found relief from Lidoderm patches or from gabapentin.  I also asked her to follow-up with her male gynecologist for what their plan is.  She could obtain a second opinion from gynecology at the [facility].  She expressed understanding and her questions were answered.    I have reviewed the nursing notes.    I have reviewed the findings, diagnosis, plan and need for follow up with the  patient.       Discharge Medication List as of 11/12/2020  2:01 PM      START taking these medications    Details   !! oxyCODONE (ROXICODONE) 5 MG tablet Take 1 tablet (5 mg) by mouth every 6 hours as needed for pain, Disp-12 tablet, R-0, E-Prescribe       !! - Potential duplicate medications found. Please discuss with provider.          Final diagnoses:   Chronic pelvic pain in female   Chronic buttock pain       11/12/2020   [facility] EMERGENCY DEPT     Devin Sloan MD  11/12/20 [id number]

## 2020-11-12 NOTE — ED NOTES
Pt crying and requesting to have surgery today to fix endometriosis.   Pt states had world known provider, Dr. Egan, from [facility] that is supposed to be getting specialist together to preform surgery but pt cannot wait for this surgery. Pt reports that every month pt comes into ER to  get get admitted to hospital for pain control.   Pt not very forth coming regarding medical appointments or current care at this time.

## 2020-11-12 NOTE — ED AVS SNAPSHOT
[facility]  [address] [address]  Phone: [phone]  Fax: [phone]                                    Winifred Ibarra   MRN: [id number]    Department: [facility]   Date of Visit: 11/12/2020           After Visit Summary Signature Page    I have received my discharge instructions, and my questions have been answered. I have discussed any challenges I see with this plan with the nurse or doctor.    ..........................................................................................................................................  Patient/Patient Representative Signature      ..........................................................................................................................................  Patient Representative Print Name and Relationship to Patient    ..................................................               ................................................  Date                                   Time    ..........................................................................................................................................  Reviewed by Signature/Title    ...................................................              ..............................................  Date                                               Time          22EPIC Rev 08/18

## 2020-11-20 ENCOUNTER — APPOINTMENT (OUTPATIENT)
Dept: CT IMAGING | Facility: CLINIC | Age: 30
End: 2020-11-20
Attending: FAMILY MEDICINE
Payer: MEDICARE

## 2020-11-20 ENCOUNTER — HOSPITAL ENCOUNTER (EMERGENCY)
Facility: CLINIC | Age: 30
Discharge: HOME | End: 2020-11-20
Attending: FAMILY MEDICINE | Admitting: FAMILY MEDICINE
Payer: MEDICARE

## 2020-11-20 VITALS
HEART RATE: 76 BPM | RESPIRATION RATE: 18 BRPM | OXYGEN SATURATION: 96 % | BODY MASS INDEX: 41.16 KG/M2 | SYSTOLIC BLOOD PRESSURE: 136 MMHG | DIASTOLIC BLOOD PRESSURE: 80 MMHG | TEMPERATURE: 97.3 F | WEIGHT: 255 LBS

## 2020-11-20 DIAGNOSIS — N20.1 URETERAL STONE: ICD-10-CM

## 2020-11-20 LAB
ALBUMIN SERPL-MCNC: 3.8 G/DL (ref 3.4–5)
ALBUMIN UR-MCNC: 30 MG/DL
ALP SERPL-CCNC: 69 U/L (ref 40–150)
ALT SERPL W P-5'-P-CCNC: 22 U/L (ref 0–50)
AMORPH CRY #/AREA URNS HPF: ABNORMAL /HPF
ANION GAP SERPL CALCULATED.3IONS-SCNC: 4 MMOL/L (ref 3–14)
APPEARANCE UR: ABNORMAL
AST SERPL W P-5'-P-CCNC: 19 U/L (ref 0–45)
BACTERIA #/AREA URNS HPF: ABNORMAL /HPF
BASOPHILS # BLD AUTO: 0 10E9/L (ref 0–0.2)
BASOPHILS NFR BLD AUTO: 0.3 %
BILIRUB SERPL-MCNC: 0.4 MG/DL (ref 0.2–1.3)
BILIRUB UR QL STRIP: NEGATIVE
BUN SERPL-MCNC: 12 MG/DL (ref 7–30)
CALCIUM SERPL-MCNC: 9.3 MG/DL (ref 8.5–10.1)
CHLORIDE SERPL-SCNC: 108 MMOL/L (ref 94–109)
CO2 SERPL-SCNC: 26 MMOL/L (ref 20–32)
COLOR UR AUTO: YELLOW
CREAT SERPL-MCNC: 1.26 MG/DL (ref 0.52–1.04)
DIFFERENTIAL METHOD BLD: NORMAL
EOSINOPHIL # BLD AUTO: 0 10E9/L (ref 0–0.7)
EOSINOPHIL NFR BLD AUTO: 0 %
ERYTHROCYTE [DISTWIDTH] IN BLOOD BY AUTOMATED COUNT: 13.1 % (ref 10–15)
GFR SERPL CREATININE-BSD FRML MDRD: 57 ML/MIN/{1.73_M2}
GLUCOSE SERPL-MCNC: 90 MG/DL (ref 70–99)
GLUCOSE UR STRIP-MCNC: NEGATIVE MG/DL
HCG SERPL QL: NEGATIVE
HCT VFR BLD AUTO: 36.9 % (ref 35–47)
HGB BLD-MCNC: 12.3 G/DL (ref 11.7–15.7)
HGB UR QL STRIP: ABNORMAL
IMM GRANULOCYTES # BLD: 0 10E9/L (ref 0–0.4)
IMM GRANULOCYTES NFR BLD: 0.3 %
KETONES UR STRIP-MCNC: 20 MG/DL
LEUKOCYTE ESTERASE UR QL STRIP: NEGATIVE
LYMPHOCYTES # BLD AUTO: 1 10E9/L (ref 0.8–5.3)
LYMPHOCYTES NFR BLD AUTO: 15.2 %
MCH RBC QN AUTO: 31.9 PG (ref 26.5–33)
MCHC RBC AUTO-ENTMCNC: 33.3 G/DL (ref 31.5–36.5)
MCV RBC AUTO: 96 FL (ref 78–100)
MONOCYTES # BLD AUTO: 0.5 10E9/L (ref 0–1.3)
MONOCYTES NFR BLD AUTO: 8.4 %
MUCOUS THREADS #/AREA URNS LPF: PRESENT /LPF
NEUTROPHILS # BLD AUTO: 4.8 10E9/L (ref 1.6–8.3)
NEUTROPHILS NFR BLD AUTO: 75.8 %
NITRATE UR QL: NEGATIVE
NRBC # BLD AUTO: 0 10*3/UL
NRBC BLD AUTO-RTO: 0 /100
PH UR STRIP: 8 PH (ref 5–7)
PLATELET # BLD AUTO: 282 10E9/L (ref 150–450)
POTASSIUM SERPL-SCNC: 4.2 MMOL/L (ref 3.4–5.3)
PROT SERPL-MCNC: 8.1 G/DL (ref 6.8–8.8)
RBC # BLD AUTO: 3.85 10E12/L (ref 3.8–5.2)
RBC #/AREA URNS AUTO: 27 /HPF (ref 0–2)
SODIUM SERPL-SCNC: 138 MMOL/L (ref 133–144)
SOURCE: ABNORMAL
SP GR UR STRIP: 1.02 (ref 1–1.03)
SQUAMOUS #/AREA URNS AUTO: 4 /HPF (ref 0–1)
UROBILINOGEN UR STRIP-MCNC: 0 MG/DL (ref 0–2)
WBC # BLD AUTO: 6.3 10E9/L (ref 4–11)
WBC #/AREA URNS AUTO: 3 /HPF (ref 0–5)

## 2020-11-20 PROCEDURE — 99285 EMERGENCY DEPT VISIT HI MDM: CPT | Mod: 25 | Performed by: FAMILY MEDICINE

## 2020-11-20 PROCEDURE — 81001 URINALYSIS AUTO W/SCOPE: CPT | Performed by: ANESTHESIOLOGY

## 2020-11-20 PROCEDURE — 96375 TX/PRO/DX INJ NEW DRUG ADDON: CPT | Performed by: FAMILY MEDICINE

## 2020-11-20 PROCEDURE — 96361 HYDRATE IV INFUSION ADD-ON: CPT | Performed by: FAMILY MEDICINE

## 2020-11-20 PROCEDURE — 93005 ELECTROCARDIOGRAM TRACING: CPT | Performed by: FAMILY MEDICINE

## 2020-11-20 PROCEDURE — 258N000003 HC RX IP 258 OP 636: Performed by: FAMILY MEDICINE

## 2020-11-20 PROCEDURE — 80053 COMPREHEN METABOLIC PANEL: CPT | Performed by: FAMILY MEDICINE

## 2020-11-20 PROCEDURE — 84703 CHORIONIC GONADOTROPIN ASSAY: CPT | Performed by: FAMILY MEDICINE

## 2020-11-20 PROCEDURE — 96374 THER/PROPH/DIAG INJ IV PUSH: CPT | Performed by: FAMILY MEDICINE

## 2020-11-20 PROCEDURE — 99285 EMERGENCY DEPT VISIT HI MDM: CPT | Performed by: FAMILY MEDICINE

## 2020-11-20 PROCEDURE — 96376 TX/PRO/DX INJ SAME DRUG ADON: CPT | Performed by: FAMILY MEDICINE

## 2020-11-20 PROCEDURE — 250N000011 HC RX IP 250 OP 636: Performed by: FAMILY MEDICINE

## 2020-11-20 PROCEDURE — 74176 CT ABD & PELVIS W/O CONTRAST: CPT

## 2020-11-20 PROCEDURE — 85025 COMPLETE CBC W/AUTO DIFF WBC: CPT | Performed by: FAMILY MEDICINE

## 2020-11-20 RX ORDER — KETOROLAC TROMETHAMINE 15 MG/ML
15 INJECTION, SOLUTION INTRAMUSCULAR; INTRAVENOUS ONCE
Status: COMPLETED | OUTPATIENT
Start: 2020-11-20 | End: 2020-11-20

## 2020-11-20 RX ORDER — ONDANSETRON 2 MG/ML
8 INJECTION INTRAMUSCULAR; INTRAVENOUS ONCE
Status: COMPLETED | OUTPATIENT
Start: 2020-11-20 | End: 2020-11-20

## 2020-11-20 RX ADMIN — SODIUM CHLORIDE, POTASSIUM CHLORIDE, SODIUM LACTATE AND CALCIUM CHLORIDE 1000 ML: 600; 310; 30; 20 INJECTION, SOLUTION INTRAVENOUS at 14:27

## 2020-11-20 RX ADMIN — ONDANSETRON 8 MG: 2 INJECTION INTRAMUSCULAR; INTRAVENOUS at 14:27

## 2020-11-20 RX ADMIN — KETOROLAC TROMETHAMINE 15 MG: 15 INJECTION, SOLUTION INTRAMUSCULAR; INTRAVENOUS at 15:22

## 2020-11-20 RX ADMIN — KETOROLAC TROMETHAMINE 15 MG: 15 INJECTION, SOLUTION INTRAMUSCULAR; INTRAVENOUS at 14:32

## 2020-11-20 NOTE — DISCHARGE INSTRUCTIONS
Return to the Emergency Room if the following occurs:     Severely worsened pain, fever >101, or for any concern at anytime.    Or, follow-up with the following provider as we discussed:     Return to your primary doctor as needed.    Medications discussed:    None new.  No changes.    If you received pain-relieving or sedating medication during your time in the ER, avoid alcohol, driving automobiles, or working with machinery.  Also, a responsible adult must stay with you.        Call the Nurse Advice Line at [phone] or [phone] for any concern at anytime.

## 2020-11-20 NOTE — ED NOTES
"Pt comes in with multiple complaints.  C/o ruq pain, vomiting, low abd pain when trying to urinate, rt flank pain, \"dark urine.\" rt leg pain \"from endometriosis, ringing in ears, and mid sternal cp.  Cp pain started  Today. Vomiting and RUQ for past 2 days.  The rest of her symptoms have been for a week.  "

## 2020-11-20 NOTE — ED AVS SNAPSHOT
[facility]  [address] [address]  Phone: [phone]  Fax: [phone]                                    Winifred Ibarra   MRN: [id number]    Department: [facility]   Date of Visit: 11/20/2020           After Visit Summary Signature Page    I have received my discharge instructions, and my questions have been answered. I have discussed any challenges I see with this plan with the nurse or doctor.    ..........................................................................................................................................  Patient/Patient Representative Signature      ..........................................................................................................................................  Patient Representative Print Name and Relationship to Patient    ..................................................               ................................................  Date                                   Time    ..........................................................................................................................................  Reviewed by Signature/Title    ...................................................              ..............................................  Date                                               Time          22EPIC Rev 08/18

## 2020-11-20 NOTE — ED PROVIDER NOTES
"  HPI   The patient is a 30-year-old female presenting with right flank, abdominal, pelvic pain and nausea.  She has a known history of endometriosis with surgical removal.  She denies having had any history of urinary tract infection or pyelonephritis.  Known history of pulmonary embolism.  Known history of substance abuse.    The patient has had abdominal/right flank pain since about a week ago.  She reports increasing right lower quadrant and pelvic pain over the past few days.  She has urinary urgency and dysuria.  She has \"vaginal irritation but no discharge.\"  She has some mild vaginal bleeding but she is \"coming off of my cycle.\"  No fever.  She is nauseous and retching.  She denies diarrhea.  She denies constipation.  No trauma or injury.  She points toward her right lateral chest as well and says it hurts there.  She tells me that it hurts in this area when she takes a deep breath.  Because of this, she feels like she is short of breath that time.  She denies having a cough or congestion.  No rhinorrhea.  No headache or sore throat.  She rates her pain is moderate to severe.        Allergies:  Allergies   Allergen Reactions     Gold      Ibuprofen Nausea and Vomiting     Silver      Sodium Hypochlorite      Bleach       Problem List:    Patient Active Problem List    Diagnosis Date Noted     Muscle spasm 08/28/2020     Priority: Medium     Pelvic and perineal pain 07/09/2020     Priority: Medium     Endometriosis 04/09/2019     Priority: Medium     Added automatically from request for surgery [id number]  Added automatically from request for surgery [id number]  Added automatically from request for surgery [id number]  Added automatically from request for surgery [id number]       Hx pulmonary embolism 04/08/2019     Priority: Medium     Per patient report in [place], states negative workup for provoking factors, stopped anticoagulation use when moved to [place] not by medical advise  Per patient report in [place], states negative " "workup for provoking factors, stopped anticoagulation use when moved to [place] not by medical advise  Per patient report in [place], [facility] negative workup for provoking factors, stopped anticoagulation use when moved to [place] not by medical advise  Per patient report in [place], [facility] negative workup for provoking factors, stopped anticoagulation use when moved to [place] not by medical advise       Dysmenorrhea 05/28/2018     Priority: Medium     Acute stress reaction 12/12/2015     Priority: Medium     Partner \"put me out\" on Wednesday, 12/09/15.       Bipolar disorder, curr episode depressed, severe, w/psychotic features (H) 12/12/2015     Priority: Medium     H/o \"getting the shot\" (deconoate medication?), previous hospitalization in [place] (4 years ago when pt 22 yo).       History of being obese 12/12/2015     Priority: Medium     Suicidal ideations 12/12/2015     Priority: Medium     Bipolar affective disorder, currently depressed, moderate (H) 05/27/2015     Priority: Medium     Depression with anxiety 05/27/2015     Priority: Medium     OCD (obsessive compulsive disorder) 05/27/2015     Priority: Medium     PTSD (post-traumatic stress disorder) 05/27/2015     Priority: Medium     Encounter for long-term (current) use of medications 05/27/2015     Priority: Medium     Encounter for long-term (current) use of other medications       History of DVT (deep vein thrombosis) 05/27/2015     Priority: Medium     History of DVT (deep vein thrombosis) - per patient recall  History of DVT (deep vein thrombosis) - per patient recall  History of DVT (deep vein thrombosis) - per patient recall  History of DVT (deep vein thrombosis) - per patient recall       Panic attacks 05/27/2015     Priority: Medium     Social phobia 05/27/2015     Priority: Medium     Substance abuse in remission (H) 05/27/2015     Priority: Medium      Past Medical History:    Past Medical History:   Diagnosis Date     Anxiety      Bipolar depression (H)  "     Chronic abdominal pain      Chronic back pain      Chronic insomnia      Depressive disorder      DVT (deep vein thrombosis) in pregnancy      Endometriosis      Ovarian cyst      PE (pulmonary thromboembolism) (H)      Past Surgical History:    Past Surgical History:   Procedure Laterality Date     DAVINCI ASSISTED ABLATION / EXCISION OF ENDOMETRIOSIS       Family History:    No family history on file.  Social History:  Marital Status:  Single [1]  Social History     Tobacco Use     Smoking status: Not on file   Substance Use Topics     Alcohol use: Not on file     Drug use: Not on file      Medications:         celecoxib (CELEBREX) 200 MG capsule       medical cannabis (Patient's own supply)       methocarbamol (ROBAXIN) 500 MG tablet       ondansetron (ZOFRAN ODT) 4 MG ODT tab       oxyCODONE (ROXICODONE) 5 MG tablet       oxyCODONE (ROXICODONE) 5 MG tablet      Review of Systems   All other systems reviewed and are negative.      PE   BP: (!) 141/85  Pulse: 72  Temp: 97.3  F (36.3  C)  Resp: 18  Weight: 115.7 kg (255 lb)  Physical Exam  Vitals signs reviewed.   Constitutional:       General: She is in acute distress.      Appearance: She is well-developed. She is obese.      Comments: Tearful, cooperative.   HENT:      Head: Normocephalic and atraumatic.      Right Ear: External ear normal.      Left Ear: External ear normal.      Nose: Nose normal.      Mouth/Throat:      Mouth: Mucous membranes are moist.      Pharynx: Oropharynx is clear.   Eyes:      Extraocular Movements: Extraocular movements intact.      Conjunctiva/sclera: Conjunctivae normal.      Pupils: Pupils are equal, round, and reactive to light.   Neck:      Musculoskeletal: Normal range of motion.   Cardiovascular:      Rate and Rhythm: Normal rate and regular rhythm.   Pulmonary:      Effort: Pulmonary effort is normal.   Abdominal:      Comments: Tender in the R abdomen and RLQ.  Soft throughout.  She does guard as I push deeply into the  right lower quadrant.   Musculoskeletal: Normal range of motion.   Skin:     General: Skin is warm and dry.   Neurological:      Mental Status: She is alert and oriented to person, place, and time.   Psychiatric:         Behavior: Behavior normal.         ED COURSE and [facility]   1420.  The patient has right flank, abdominal, pelvic pain and tenderness.  She has dysuria and urgency of urination.  Urine analysis pending.  Serum hCG added.  Other lab values pending.  Toradol, Zofran ordered.  Fluid bolus.    1510.  Pain is rated 6/10, better than when she arrived.  Repeat Toradol ordered.  Hematuria present but no evidence for infection.  She does not have a history of ureteral stones.  Unknown family history.  CT scan without contrast ordered.    1646.  The patient has evidence of a stone in the bladder.  She has right renal inflammatory changes.  It is most likely that she recently passed a stone which is consistent with her history.  The patient tells me that her pain is much improved.  She is smiling.  She is without distress.  No vomiting.  No prescription medication at this time.  Follow-up discussed.  Return for worsening.    LABS  Labs Ordered and Resulted from Time of ED Arrival Up to the Time of Departure from the ED   URINE MACROSCOPIC WITH REFLEX TO MICRO - Abnormal; Notable for the following components:       Result Value    Ketones Urine 20 (*)     Blood Urine Small (*)     pH Urine 8.0 (*)     Protein Albumin Urine 30 (*)     RBC Urine 27 (*)     Bacteria Urine Few (*)     Squamous Epithelial /HPF Urine 4 (*)     Mucous Urine Present (*)     Amorphous Crystals Few (*)     All other components within normal limits   COMPREHENSIVE METABOLIC PANEL - Abnormal; Notable for the following components:    Creatinine 1.26 (*)     GFR Estimate 57 (*)     All other components within normal limits   CBC WITH PLATELETS DIFFERENTIAL   HCG QUALITATIVE       IMAGING  Images reviewed by me.  Radiology report also  reviewed.  CT Abdomen Pelvis w/o Contrast   Final Result   IMPRESSION:    1.  0.2 cm stone within the left posterior bladder lumen. Mild right   renal edema, and no hydronephrosis. This stone may be recently passed   into the bladder from the right side.   2.  Small pelvic free fluid.      CESIA LEES MD          Procedures    Medications   ketorolac (TORADOL) injection 15 mg (15 mg Intravenous Given 11/20/20 1432)   ondansetron (ZOFRAN) injection 8 mg (8 mg Intravenous Given 11/20/20 1427)   lactated ringers BOLUS 1,000 mL (1,000 mLs Intravenous New Bag 11/20/20 1427)   ketorolac (TORADOL) injection 15 mg (15 mg Intravenous Given 11/20/20 1522)         IMPRESSION       ICD-10-CM    1. Ureteral stone  N20.1             Medication List      There are no discharge medications for this visit.                       Tremayne Crook MD  11/20/20 [id number]

## 2020-11-20 NOTE — ED TRIAGE NOTES
Pt on misotrostol 200 for endometriosis pain and states it is causing pain to use bathroom, difficulty urinating. Pain in the right side of abdomen.

## 2020-11-22 ENCOUNTER — HOSPITAL ENCOUNTER (EMERGENCY)
Facility: CLINIC | Age: 30
Discharge: HOME | End: 2020-11-22
Attending: FAMILY MEDICINE | Admitting: FAMILY MEDICINE
Payer: MEDICARE

## 2020-11-22 ENCOUNTER — ANCILLARY PROCEDURE (OUTPATIENT)
Dept: ULTRASOUND IMAGING | Facility: CLINIC | Age: 30
End: 2020-11-22
Attending: FAMILY MEDICINE
Payer: MEDICARE

## 2020-11-22 ENCOUNTER — APPOINTMENT (OUTPATIENT)
Dept: GENERAL RADIOLOGY | Facility: CLINIC | Age: 30
End: 2020-11-22
Attending: FAMILY MEDICINE
Payer: MEDICARE

## 2020-11-22 VITALS
OXYGEN SATURATION: 100 % | BODY MASS INDEX: 40.98 KG/M2 | DIASTOLIC BLOOD PRESSURE: 72 MMHG | WEIGHT: 255 LBS | TEMPERATURE: 97.2 F | HEART RATE: 64 BPM | HEIGHT: 66 IN | RESPIRATION RATE: 18 BRPM | SYSTOLIC BLOOD PRESSURE: 124 MMHG

## 2020-11-22 DIAGNOSIS — M54.41 ACUTE RIGHT-SIDED LOW BACK PAIN WITH RIGHT-SIDED SCIATICA: ICD-10-CM

## 2020-11-22 DIAGNOSIS — G57.01 PIRIFORMIS SYNDROME, RIGHT: ICD-10-CM

## 2020-11-22 LAB
ALBUMIN SERPL-MCNC: 3.6 G/DL (ref 3.4–5)
ALBUMIN UR-MCNC: NEGATIVE MG/DL
ALP SERPL-CCNC: 66 U/L (ref 40–150)
ALT SERPL W P-5'-P-CCNC: 22 U/L (ref 0–50)
ANION GAP SERPL CALCULATED.3IONS-SCNC: 3 MMOL/L (ref 3–14)
APPEARANCE UR: ABNORMAL
AST SERPL W P-5'-P-CCNC: 16 U/L (ref 0–45)
BACTERIA #/AREA URNS HPF: ABNORMAL /HPF
BASOPHILS # BLD AUTO: 0 10E9/L (ref 0–0.2)
BASOPHILS NFR BLD AUTO: 0.4 %
BILIRUB SERPL-MCNC: 0.3 MG/DL (ref 0.2–1.3)
BILIRUB UR QL STRIP: NEGATIVE
BUN SERPL-MCNC: 9 MG/DL (ref 7–30)
CALCIUM SERPL-MCNC: 8.6 MG/DL (ref 8.5–10.1)
CHLORIDE SERPL-SCNC: 111 MMOL/L (ref 94–109)
CO2 SERPL-SCNC: 28 MMOL/L (ref 20–32)
COLOR UR AUTO: YELLOW
CREAT SERPL-MCNC: 0.91 MG/DL (ref 0.52–1.04)
DIFFERENTIAL METHOD BLD: ABNORMAL
EOSINOPHIL # BLD AUTO: 0 10E9/L (ref 0–0.7)
EOSINOPHIL NFR BLD AUTO: 0.8 %
ERYTHROCYTE [DISTWIDTH] IN BLOOD BY AUTOMATED COUNT: 13 % (ref 10–15)
GFR SERPL CREATININE-BSD FRML MDRD: 86 ML/MIN/{1.73_M2}
GLUCOSE SERPL-MCNC: 77 MG/DL (ref 70–99)
GLUCOSE UR STRIP-MCNC: NEGATIVE MG/DL
HCG UR QL: NEGATIVE
HCT VFR BLD AUTO: 35.6 % (ref 35–47)
HGB BLD-MCNC: 11.8 G/DL (ref 11.7–15.7)
HGB UR QL STRIP: NEGATIVE
IMM GRANULOCYTES # BLD: 0 10E9/L (ref 0–0.4)
IMM GRANULOCYTES NFR BLD: 0.4 %
KETONES UR STRIP-MCNC: NEGATIVE MG/DL
LEUKOCYTE ESTERASE UR QL STRIP: NEGATIVE
LYMPHOCYTES # BLD AUTO: 1.5 10E9/L (ref 0.8–5.3)
LYMPHOCYTES NFR BLD AUTO: 28.8 %
MCH RBC QN AUTO: 32.1 PG (ref 26.5–33)
MCHC RBC AUTO-ENTMCNC: 33.1 G/DL (ref 31.5–36.5)
MCV RBC AUTO: 97 FL (ref 78–100)
MONOCYTES # BLD AUTO: 0.6 10E9/L (ref 0–1.3)
MONOCYTES NFR BLD AUTO: 12 %
MUCOUS THREADS #/AREA URNS LPF: PRESENT /LPF
NEUTROPHILS # BLD AUTO: 3 10E9/L (ref 1.6–8.3)
NEUTROPHILS NFR BLD AUTO: 57.6 %
NITRATE UR QL: NEGATIVE
NRBC # BLD AUTO: 0 10*3/UL
NRBC BLD AUTO-RTO: 0 /100
PH UR STRIP: 8 PH (ref 5–7)
PLATELET # BLD AUTO: 283 10E9/L (ref 150–450)
POTASSIUM SERPL-SCNC: 4 MMOL/L (ref 3.4–5.3)
PROT SERPL-MCNC: 7.7 G/DL (ref 6.8–8.8)
RBC # BLD AUTO: 3.68 10E12/L (ref 3.8–5.2)
RBC #/AREA URNS AUTO: 1 /HPF (ref 0–2)
SODIUM SERPL-SCNC: 142 MMOL/L (ref 133–144)
SOURCE: ABNORMAL
SP GR UR STRIP: 1.01 (ref 1–1.03)
SQUAMOUS #/AREA URNS AUTO: 3 /HPF (ref 0–1)
UROBILINOGEN UR STRIP-MCNC: 2 MG/DL (ref 0–2)
WBC # BLD AUTO: 5.3 10E9/L (ref 4–11)
WBC #/AREA URNS AUTO: 3 /HPF (ref 0–5)

## 2020-11-22 PROCEDURE — 76705 ECHO EXAM OF ABDOMEN: CPT | Performed by: FAMILY MEDICINE

## 2020-11-22 PROCEDURE — 99285 EMERGENCY DEPT VISIT HI MDM: CPT | Mod: 25 | Performed by: FAMILY MEDICINE

## 2020-11-22 PROCEDURE — 96374 THER/PROPH/DIAG INJ IV PUSH: CPT | Performed by: FAMILY MEDICINE

## 2020-11-22 PROCEDURE — 250N000011 HC RX IP 250 OP 636: Performed by: FAMILY MEDICINE

## 2020-11-22 PROCEDURE — 93971 EXTREMITY STUDY: CPT | Mod: RT

## 2020-11-22 PROCEDURE — 96361 HYDRATE IV INFUSION ADD-ON: CPT | Performed by: FAMILY MEDICINE

## 2020-11-22 PROCEDURE — 250N000013 HC RX MED GY IP 250 OP 250 PS 637: Mod: GY | Performed by: FAMILY MEDICINE

## 2020-11-22 PROCEDURE — 85025 COMPLETE CBC W/AUTO DIFF WBC: CPT | Performed by: FAMILY MEDICINE

## 2020-11-22 PROCEDURE — 81001 URINALYSIS AUTO W/SCOPE: CPT | Performed by: FAMILY MEDICINE

## 2020-11-22 PROCEDURE — 76705 ECHO EXAM OF ABDOMEN: CPT | Mod: 26 | Performed by: FAMILY MEDICINE

## 2020-11-22 PROCEDURE — 81025 URINE PREGNANCY TEST: CPT | Performed by: FAMILY MEDICINE

## 2020-11-22 PROCEDURE — 258N000003 HC RX IP 258 OP 636: Performed by: FAMILY MEDICINE

## 2020-11-22 PROCEDURE — 80053 COMPREHEN METABOLIC PANEL: CPT | Performed by: FAMILY MEDICINE

## 2020-11-22 PROCEDURE — 73590 X-RAY EXAM OF LOWER LEG: CPT | Mod: RT

## 2020-11-22 PROCEDURE — 96375 TX/PRO/DX INJ NEW DRUG ADDON: CPT | Performed by: FAMILY MEDICINE

## 2020-11-22 RX ORDER — SODIUM CHLORIDE 9 MG/ML
INJECTION, SOLUTION INTRAVENOUS CONTINUOUS
Status: DISCONTINUED | OUTPATIENT
Start: 2020-11-22 | End: 2020-11-22 | Stop reason: HOSPADM

## 2020-11-22 RX ORDER — OXYCODONE HYDROCHLORIDE 5 MG/1
5 TABLET ORAL EVERY 6 HOURS PRN
Qty: 12 TABLET | Refills: 0 | Status: SHIPPED | OUTPATIENT
Start: 2020-11-22 | End: 2020-11-22

## 2020-11-22 RX ORDER — HYDROMORPHONE HYDROCHLORIDE 1 MG/ML
0.5 INJECTION, SOLUTION INTRAMUSCULAR; INTRAVENOUS; SUBCUTANEOUS ONCE
Status: COMPLETED | OUTPATIENT
Start: 2020-11-22 | End: 2020-11-22

## 2020-11-22 RX ORDER — KETOROLAC TROMETHAMINE 15 MG/ML
15 INJECTION, SOLUTION INTRAMUSCULAR; INTRAVENOUS ONCE
Status: COMPLETED | OUTPATIENT
Start: 2020-11-22 | End: 2020-11-22

## 2020-11-22 RX ORDER — OXYCODONE HYDROCHLORIDE 5 MG/1
5 TABLET ORAL EVERY 6 HOURS PRN
Qty: 6 TABLET | Refills: 0 | Status: SHIPPED | OUTPATIENT
Start: 2020-11-22 | End: 2020-12-10

## 2020-11-22 RX ORDER — LIDOCAINE 4 G/G
1 PATCH TOPICAL
Status: DISCONTINUED | OUTPATIENT
Start: 2020-11-22 | End: 2020-11-22 | Stop reason: HOSPADM

## 2020-11-22 RX ORDER — CELECOXIB 200 MG/1
200 CAPSULE ORAL DAILY PRN
Qty: 15 CAPSULE | Refills: 0 | Status: SHIPPED | OUTPATIENT
Start: 2020-11-22

## 2020-11-22 RX ORDER — OXYCODONE HYDROCHLORIDE 5 MG/1
5 TABLET ORAL EVERY 4 HOURS PRN
Status: DISCONTINUED | OUTPATIENT
Start: 2020-11-22 | End: 2020-11-22 | Stop reason: HOSPADM

## 2020-11-22 RX ORDER — CELECOXIB 200 MG/1
200 CAPSULE ORAL
Qty: 14 CAPSULE | Refills: 0 | Status: SHIPPED | OUTPATIENT
Start: 2020-11-22 | End: 2020-11-22

## 2020-11-22 RX ADMIN — HYDROMORPHONE HYDROCHLORIDE 0.5 MG: 1 INJECTION, SOLUTION INTRAMUSCULAR; INTRAVENOUS; SUBCUTANEOUS at 07:45

## 2020-11-22 RX ADMIN — LIDOCAINE 1 PATCH: 560 PATCH PERCUTANEOUS; TOPICAL; TRANSDERMAL at 09:02

## 2020-11-22 RX ADMIN — SODIUM CHLORIDE 1000 ML: 9 INJECTION, SOLUTION INTRAVENOUS at 07:43

## 2020-11-22 RX ADMIN — KETOROLAC TROMETHAMINE 15 MG: 15 INJECTION, SOLUTION INTRAMUSCULAR; INTRAVENOUS at 07:43

## 2020-11-22 ASSESSMENT — ENCOUNTER SYMPTOMS
FREQUENCY: 0
COUGH: 0
SORE THROAT: 0
BLOOD IN STOOL: 0
VOMITING: 0
SHORTNESS OF BREATH: 0
ABDOMINAL PAIN: 0
BACK PAIN: 1
FLANK PAIN: 1
DYSURIA: 0
FEVER: 0
SINUS PRESSURE: 0
CONSTIPATION: 0
CHILLS: 0
PALPITATIONS: 0
WHEEZING: 0
HEADACHES: 0
NAUSEA: 1
DIAPHORESIS: 1
DIARRHEA: 0

## 2020-11-22 ASSESSMENT — MIFFLIN-ST. JEOR: SCORE: 1898.42

## 2020-11-22 NOTE — ED PROVIDER NOTES
History     Chief Complaint   Patient presents with     Flank Pain     HPI  Winifred Ibarra is a 30 year old female who presents with a history of bipolar disorder, obsessive-compulsive disorder posttraumatic stress disorder prior DVT and PE.  She also has a history of endometriosis and pelvic pain.  History of substance abuse in remission.    She presents with a history of evaluation for right lower quadrant abdominal pain and right flank pain.  She had urinary urgency and dysuria some vaginal irritation but no discharge.  Mild vaginal bleeding after completing menstrual cycle.  No pregnancy.  Arrived here with a few days of progressive and severe back pain that radiated into the leg and emanated to and from the flank region and underwent CT of the abdomen pelvis noncontrast demonstrating what appeared to be a stone in the bladder and signs of possible prior ureteral stone but no active hydronephrosis.  Treated with pain medications at that visit.  Home with precautions and pain recurred and was severe on her representation today.  Pain is in the low back and SI joint region as well as the sciatic notch region.  Localized pain there she notes is tender to touch but sometimes improves pain when it is palpated.  She has radiating pain down the posterior aspect of the leg and admits to some swelling of the calf on the right side that may be new.  She has frequent falls she tells me sometimes every day and has been evaluated in clinic for this.  She denies abuse.  She 2 weeks ago struck her tibia-fibula on falling.  Is more painful to urinate and stool, but there is not appear to be significant retention of urine or stool.  There is no saddle anesthesia.  She feels that it is painful to walk but no obvious foot drop at least per exam below.    Her prior history of venous thromboembolism    Allergies:  Allergies   Allergen Reactions     Gold      Ibuprofen Nausea and Vomiting     Silver      Sodium Hypochlorite       "Bleach         Problem List:    Patient Active Problem List    Diagnosis Date Noted     Muscle spasm 08/28/2020     Priority: Medium     Pelvic and perineal pain 07/09/2020     Priority: Medium     Endometriosis 04/09/2019     Priority: Medium     Added automatically from request for surgery [id number]  Added automatically from request for surgery [id number]  Added automatically from request for surgery [id number]  Added automatically from request for surgery [id number]       Hx pulmonary embolism 04/08/2019     Priority: Medium     Per patient report in [place], [facility] negative workup for provoking factors, stopped anticoagulation use when moved to [place] not by medical advise  Per patient report in [place], states negative workup for provoking factors, stopped anticoagulation use when moved to [place] not by medical advise  Per patient report in [place], states negative workup for provoking factors, stopped anticoagulation use when moved to [place] not by medical advise  Per patient report in [place], states negative workup for provoking factors, stopped anticoagulation use when moved to [place] not by medical advise       Dysmenorrhea 05/28/2018     Priority: Medium     Acute stress reaction 12/12/2015     Priority: Medium     Partner \"put me out\" on Wednesday, 12/09/15.       Bipolar disorder, curr episode depressed, severe, w/psychotic features (H) 12/12/2015     Priority: Medium     H/o \"getting the shot\" (deconoate medication?), previous hospitalization in [place] (4 years ago when pt 20 yo).       History of being obese 12/12/2015     Priority: Medium     Suicidal ideations 12/12/2015     Priority: Medium     Bipolar affective disorder, currently depressed, moderate (H) 05/27/2015     Priority: Medium     Depression with anxiety 05/27/2015     Priority: Medium     OCD (obsessive compulsive disorder) 05/27/2015     Priority: Medium     PTSD (post-traumatic stress disorder) 05/27/2015     Priority: Medium     Encounter for long-term (current) " use of medications 05/27/2015     Priority: Medium     Encounter for long-term (current) use of other medications       History of DVT (deep vein thrombosis) 05/27/2015     Priority: Medium     History of DVT (deep vein thrombosis) - per patient recall  History of DVT (deep vein thrombosis) - per patient recall  History of DVT (deep vein thrombosis) - per patient recall  History of DVT (deep vein thrombosis) - per patient recall       Panic attacks 05/27/2015     Priority: Medium     Social phobia 05/27/2015     Priority: Medium     Substance abuse in remission (H) 05/27/2015     Priority: Medium        Past Medical History:    Past Medical History:   Diagnosis Date     Anxiety      Bipolar depression (H)      Chronic abdominal pain      Chronic back pain      Chronic insomnia      Depressive disorder      DVT (deep vein thrombosis) in pregnancy      Endometriosis      Ovarian cyst      PE (pulmonary thromboembolism) (H)        Past Surgical History:    Past Surgical History:   Procedure Laterality Date     DAVINCI ASSISTED ABLATION / EXCISION OF ENDOMETRIOSIS         Family History:    No family history on file.    Social History:  Marital Status:  Single [1]  Social History     Tobacco Use     Smoking status: Not on file   Substance Use Topics     Alcohol use: Not on file     Drug use: Not on file        Medications:         celecoxib (CELEBREX) 200 MG capsule       medical cannabis (Patient's own supply)       methocarbamol (ROBAXIN) 500 MG tablet       ondansetron (ZOFRAN ODT) 4 MG ODT tab       oxyCODONE (ROXICODONE) 5 MG tablet       oxyCODONE (ROXICODONE) 5 MG tablet          Review of Systems   Constitutional: Positive for diaphoresis (pain related). Negative for chills and fever.   HENT: Negative for ear pain, sinus pressure and sore throat.    Eyes: Negative for visual disturbance.   Respiratory: Negative for cough, shortness of breath and wheezing.    Cardiovascular: Positive for leg swelling. Negative  "for chest pain and palpitations.   Gastrointestinal: Positive for nausea. Negative for abdominal pain, blood in stool, constipation, diarrhea and vomiting.   Genitourinary: Positive for flank pain. Negative for dysuria, frequency and urgency.   Musculoskeletal: Positive for back pain.   Skin: Negative for rash.   Neurological: Negative for headaches.   All other systems reviewed and are negative.      Physical Exam   BP: 135/84  Pulse: 82  Temp: 97.2  F (36.2  C)  Resp: 18  Height: 167.6 cm (5' 6\")  Weight: 115.7 kg (255 lb)  SpO2: 100 %      Physical Exam  Constitutional:       General: She is in acute distress.   HENT:      Head: Atraumatic.   Eyes:      Conjunctiva/sclera: Conjunctivae normal.   Neck:      Musculoskeletal: Neck supple.   Cardiovascular:      Rate and Rhythm: Normal rate and regular rhythm.      Pulses: Normal pulses.      Heart sounds: No murmur.   Pulmonary:      Effort: Pulmonary effort is normal. No respiratory distress.      Breath sounds: No stridor. No wheezing or rhonchi.   Abdominal:      General: Abdomen is flat. Bowel sounds are normal. There is no distension.      Tenderness: There is no abdominal tenderness. There is no right CVA tenderness or left CVA tenderness.   Musculoskeletal:      Right lower leg: Edema present.      Left lower leg: No edema.   Skin:     Coloration: Skin is not pale.      Findings: No rash.   Neurological:      General: No focal deficit present.      Mental Status: She is alert.      Sensory: No sensory deficit.      Motor: No weakness.       There is tenderness palpation over the sciatic notch -and possibly reproduces some of her pain.  The lumbosacral spine mildly tender midline but no obvious focal pain.  SI joint is minimally tender.  There is no rash here.  No step-offs or other lesions.  The posterior thigh appears to be the region of her pain and she is applying a baseball to the posterior thigh to try and work out knots in that area she says.  More of a " focal pain in that region.  She has normal distal pulses both femoral pulses palpable in dorsalis pedis posterior tibial.  There is no foot edema but the calf does appear more swollen on the right side than the left.  No obvious deformity.  Some tenderness to palpation.  She notes the anterior tibias where she had struck earlier with a fall a couple of weeks ago.      ED Course        Procedures               Critical Care time:  none               Results for orders placed or performed during the hospital encounter of 11/22/20 (from the past 24 hour(s))   POC US ABDOMEN LIMITED    Impression    [facility] Procedure Note    Limited Bedside ED Renal Ultrasound:    PERFORMED BY: Dr. Fabrizio Longo MD  INDICATIONS:  Flank Pain  PROBE: Low frequency convex probe  BODY LOCATION:  Abdomen  FINDINGS:  The ultrasound was performed with longitudinal and transverse views.   Right Kidney:   Hydronephrosis:  None   Renal cyst:  None  Left Kidney:   Hydronephrosis:  None   Renal cyst:  None  INTERPRETATION:  The evaluation of the kidneys was normal without evidence of hydronephrosis or cysts.  no significant bladder distention  IMAGE DOCUMENTATION: Images were archived to PACs system.     UA with Microscopic   Result Value Ref Range    Color Urine Yellow     Appearance Urine Slightly Cloudy     Glucose Urine Negative NEG^Negative mg/dL    Bilirubin Urine Negative NEG^Negative    Ketones Urine Negative NEG^Negative mg/dL    Specific Gravity Urine 1.015 1.003 - 1.035    Blood Urine Negative NEG^Negative    pH Urine 8.0 (H) 5.0 - 7.0 pH    Protein Albumin Urine Negative NEG^Negative mg/dL    Urobilinogen mg/dL 2.0 0.0 - 2.0 mg/dL    Nitrite Urine Negative NEG^Negative    Leukocyte Esterase Urine Negative NEG^Negative    Source Midstream Urine     WBC Urine 3 0 - 5 /HPF    RBC Urine 1 0 - 2 /HPF    Bacteria Urine Few (A) NEG^Negative /HPF    Squamous Epithelial /HPF Urine 3 (H) 0 - 1 /HPF    Mucous Urine Present (A)  NEG^Negative /LPF   HCG qualitative urine   Result Value Ref Range    HCG Qual Urine Negative NEG^Negative   Comprehensive metabolic panel   Result Value Ref Range    Sodium 142 133 - 144 mmol/L    Potassium 4.0 3.4 - 5.3 mmol/L    Chloride 111 (H) 94 - 109 mmol/L    Carbon Dioxide 28 20 - 32 mmol/L    Anion Gap 3 3 - 14 mmol/L    Glucose 77 70 - 99 mg/dL    Urea Nitrogen 9 7 - 30 mg/dL    Creatinine 0.91 0.52 - 1.04 mg/dL    GFR Estimate 86 >60 mL/min/[1.73_m2]    GFR Estimate If Black >90 >60 mL/min/[1.73_m2]    Calcium 8.6 8.5 - 10.1 mg/dL    Bilirubin Total 0.3 0.2 - 1.3 mg/dL    Albumin 3.6 3.4 - 5.0 g/dL    Protein Total 7.7 6.8 - 8.8 g/dL    Alkaline Phosphatase 66 40 - 150 U/L    ALT 22 0 - 50 U/L    AST 16 0 - 45 U/L   CBC with platelets differential   Result Value Ref Range    WBC 5.3 4.0 - 11.0 10e9/L    RBC Count 3.68 (L) 3.8 - 5.2 10e12/L    Hemoglobin 11.8 11.7 - 15.7 g/dL    Hematocrit 35.6 35.0 - 47.0 %    MCV 97 78 - 100 fl    MCH 32.1 26.5 - 33.0 pg    MCHC 33.1 31.5 - 36.5 g/dL    RDW 13.0 10.0 - 15.0 %    Platelet Count 283 150 - 450 10e9/L    Diff Method Automated Method     % Neutrophils 57.6 %    % Lymphocytes 28.8 %    % Monocytes 12.0 %    % Eosinophils 0.8 %    % Basophils 0.4 %    % Immature Granulocytes 0.4 %    Nucleated RBCs 0 0 /100    Absolute Neutrophil 3.0 1.6 - 8.3 10e9/L    Absolute Lymphocytes 1.5 0.8 - 5.3 10e9/L    Absolute Monocytes 0.6 0.0 - 1.3 10e9/L    Absolute Eosinophils 0.0 0.0 - 0.7 10e9/L    Absolute Basophils 0.0 0.0 - 0.2 10e9/L    Abs Immature Granulocytes 0.0 0 - 0.4 10e9/L    Absolute Nucleated RBC 0.0    XR Tibia & Fibula Right 2 Views    Narrative    XR TIBIA & FIBULA RT 2 VW 11/22/2020 8:06 AM    HISTORY: pain/swelling after trauma    COMPARISON: None.    FINDINGS: Negative right tibia and fibula. No evidence of a fracture.    YOANDY HYATT MD   US Lower Extremity Venous Duplex Right    Narrative    US LOWER EXTREMITY VENOUS DUPLEX RIGHT 11/22/2020 8:25  AM    CLINICAL HISTORY: Right leg swelling and pain  TECHNIQUE: Venous Duplex ultrasound of the right lower extremity with  and without compression, augmentation and duplex. Color flow and  spectral Doppler with waveform analysis performed.    COMPARISON: None.    FINDINGS: Exam includes the common femoral, femoral, popliteal, and  contralateral common femoral veins as well as segmentally visualized  deep calf veins and greater saphenous vein.     RIGHT: No deep vein thrombosis. No superficial thrombophlebitis. No  popliteal cyst.      Impression    IMPRESSION:  1.  No deep venous thrombosis in the right lower extremity.    LUCINDA GARCÍA MD       Medications   HYDROmorphone (PF) (DILAUDID) injection 0.5 mg (has no administration in time range)   oxyCODONE (ROXICODONE) tablet 5 mg (has no administration in time range)   0.9% sodium chloride BOLUS (has no administration in time range)     Followed by   sodium chloride 0.9% infusion (has no administration in time range)       Assessments & Plan (with Medical Decision Making)     MDM: Winifred Ibarra is a 30 year old female who presents with recent evaluation for possible passed ureteral stone with last visit demonstrating bladder stone about 2 mm in size and no significant hydronephrosis.  However in the interim she has had significantly increased pain on the right side and her pain appears to be more from the sciatic notch region down the posterior aspect of the leg using massage to the area posteriorly to try and alleviate the pain.  There is no significant abdominal pain or flank pain at this point.  Her bedside ultrasound demonstrates no obvious hydronephrosis and there is no significant urinary retention on ultrasound of the bladder.  She has hip range of motion that may exacerbate the pain and there is slight increased pain with straight leg raise.  Normal distal pulses.  Normal distal motor function.  No decreased strength lower extremities and no significant  cauda equina symptoms.  Vital signs are reassuring afebrile normal blood pressure and heart rate.  She does have calf swelling on the side prior DVT PE.  Not much history in the chart related to this.  That was from [place] when she was a teenager.  Unclear cause.  Will obtain venous ultrasound to exclude DVT and tib-fib x-ray to exclude fracture.  She is already had pelvic imaging CT and it was read reviewed with radiologist this morning evaluating the musculoskeletal spine from a CT perspective and no obvious fracture of the lumbosacral spine or the pelvis or the hip proximally.  Therefore this is not reimaged.  She tells me that she falls frequently unclear etiology.  She tells me she has not abused.      If her imaging is negative I would pursue piriformis syndrome evaluation with close interval follow-up sports med nonsurgical and consider for piriformis injection.       I have reviewed the nursing notes.    I have reviewed the findings, diagnosis, plan and need for follow up with the patient.       New Prescriptions    No medications on file       Final diagnoses:   Piriformis syndrome, right - I suspect this is the cause of back  and leg pain.  avoid pressure on this area. consider doughnut. lidocaine patch to the region - appliued here - remove in 12 hours. and may reapply every 12 of every 24 hours OTC 4% patch. use tylenol 1000 mg every 6 hours.  may use oxycodone for breakthrough pain but exercise caution with this.  I would recommend  use of an antiinflammatory.  ibuprofen caused nausea.   Therefiore will use alternative med.  Will restart ce;lebrex - but you  need to take this with food or milk and would take prilosec 20 mg orally daily to prevent ulcer.   Acute right-sided low back pain with right-sided sciatica       11/22/2020   [facility] EMERGENCY DEPT     Fabrizio Longo MD  11/22/20 1846

## 2020-11-22 NOTE — DISCHARGE INSTRUCTIONS
ICD-10-CM    1. Piriformis syndrome, right  G57.01 Orthopedic & Spine  Referral    I suspect this is the cause of back  and leg pain.  avoid pressure on this area. consider doughnut. lidocaine patch to the region - appliued here - remove in 12 hours. and may reapply every 12 of every 24 hours OTC 4% patch. use tylenol 1000 mg every 6 hours.  may use oxycodone for breakthrough pain but exercise caution with this.  I would recommend  use of an antiinflammatory.  ibuprofen caused nausea.   Therefiore will use alternative med.  Will restart ce;lebrex - but you  need to take this with food or milk and would take prilosec 20 mg orally daily to prevent ulcer.   2. Acute right-sided low back pain with right-sided sciatica  M54.41

## 2020-11-22 NOTE — ED AVS SNAPSHOT
[facility]  [address] [address]  Phone: [phone]  Fax: [phone]                                    Winifred Ibarra   MRN: [id number]    Department: [facility]   Date of Visit: 11/22/2020           After Visit Summary Signature Page    I have received my discharge instructions, and my questions have been answered. I have discussed any challenges I see with this plan with the nurse or doctor.    ..........................................................................................................................................  Patient/Patient Representative Signature      ..........................................................................................................................................  Patient Representative Print Name and Relationship to Patient    ..................................................               ................................................  Date                                   Time    ..........................................................................................................................................  Reviewed by Signature/Title    ...................................................              ..............................................  Date                                               Time          22EPIC Rev 08/18

## 2020-11-22 NOTE — ED NOTES
Pt c/o rt lower back pain that radiates down back of rt leg.  Rt leg more swollen than lt.  + pulses.  Pt also c/o difficulty urinating.  No n/v/d or fevers.  Pt does say if the pain gets really intense she vomits.  Pt here a few days ago with similar symptoms.  Pt has hx of fall about a week ago and states she hurt her rt lower leg/shin area.

## 2020-11-23 ENCOUNTER — OFFICE VISIT (OUTPATIENT)
Dept: ORTHOPEDICS | Facility: CLINIC | Age: 30
End: 2020-11-23
Payer: MEDICARE

## 2020-11-23 VITALS
WEIGHT: 255 LBS | SYSTOLIC BLOOD PRESSURE: 118 MMHG | BODY MASS INDEX: 40.98 KG/M2 | DIASTOLIC BLOOD PRESSURE: 70 MMHG | HEIGHT: 66 IN

## 2020-11-23 DIAGNOSIS — G57.01 PIRIFORMIS SYNDROME, RIGHT: ICD-10-CM

## 2020-11-23 PROCEDURE — 99204 OFFICE O/P NEW MOD 45 MIN: CPT | Mod: 25 | Performed by: FAMILY MEDICINE

## 2020-11-23 PROCEDURE — 20611 DRAIN/INJ JOINT/BURSA W/US: CPT | Mod: RT | Performed by: FAMILY MEDICINE

## 2020-11-23 RX ADMIN — ROPIVACAINE HYDROCHLORIDE 2 ML: 5 INJECTION, SOLUTION EPIDURAL; INFILTRATION; PERINEURAL at 11:47

## 2020-11-23 RX ADMIN — BETAMETHASONE SODIUM PHOSPHATE AND BETAMETHASONE ACETATE 6 MG: 3; 3 INJECTION, SUSPENSION INTRA-ARTICULAR; INTRALESIONAL; INTRAMUSCULAR; SOFT TISSUE at 11:47

## 2020-11-23 ASSESSMENT — MIFFLIN-ST. JEOR: SCORE: 1898.42

## 2020-11-23 NOTE — PROGRESS NOTES
ASSESSMENT & PLAN  Winifred was seen today for pain.    Diagnoses and all orders for this visit:    Piriformis syndrome, right  Comments:  I suspect this is the cause of back  and leg pain.  avoid pressure on this area. consider doughnut. lidocaine patch to the region - appliued here - remove in 12 hours. and may reapply every 12 of every 24 hours OTC 4% patch. use tylenol 1000 mg every 6 hours.  may use oxycodone for breakthrough pain but exercise caution with this.  I would recommend  use of an antiinflammatory.  ibuprofen caused nausea.   Therefiore will use alternative med.  Will restart ce;lebrex - but you  need to take this with food or milk and would take prilosec 20 mg orally daily to prevent ulcer.  Orders:  -     Orthopedic & Spine  Referral  -     Large Joint Injection/Arthocentesis      Patient is a 30 year old female presenting for evaluation of   Chief Complaint   Patient presents with     Right Hip - Pain      # Right Buttock Pain with radicular Sx: Pt noting sx over the past several years with pain focally in the right buttock with some radiation to the posterior thigh and associated N/T in the foot.  Pt has TTP in the right sciatic notch as well as ischial tuberosity with pain going down leg and pos SLR/Slump and piriformis stretch testing.  Pt in visible discomfort on exam today sitting on baseball for comfort.  Outside records and image reads reviewed with patient from [facility] over the past several months.  She has had numerous imaging studies including lumbar plexus/lumbar MRI that were negative for significant disc herniation.  Differerential possible piriformis syndrome, lumbar radiculopathy vs hamstring tendinopathy.  No red flag symptoms/signs on history or examination.  Counseled patient on nature of condition and treatment options.  Given this plan as below, follow-up as needed  Treatment: activities as tolerated, recommend to f/u with providers at [facility]  given today, if not improved can refer for formal PT  Injection right piriformis steroid injection above sciatic nerve as below  Medications  Limited Tylenol    Concerning signs/sx that would warrant urgent evaluation were discussed.  All questions were answered, patient understands and agrees with plan.      Return if symptoms worsen or fail to improve.  -----    SUBJECTIVE  Winifred Ibarra is a/an 30 year old female who is seen in consultation at the request of  Fabrizio Longo M.D. for evaluation of piriformis pain. The patient is seen by themselves.    Onset: 3 years(s) ago. Reports insidious onset without acute precipitating event.   Location of Pain: right low back radiating to buttock and posterior thigh to knee   Rating of Pain at worst: 10/10  Rating of Pain Currently: 7/10  Worsened by: walking, standing, lying down   Better with: trigger point release   Treatments tried: Oxycodone, Celebrex, Right SI joint injection 11/3/20 at [facility], Lumar plexus MRI 10/20/20, Lumbar MRI 7/10/20  Quality: aching, dull  Red flags: Weakness: No, bowel/bladder loss: No, foot drop: No  Associated symptoms: numbness and tingling in right toes   Orthopedic history: YES - Chronic pelvic pain, DVT, PE, Endometriosis   Relevant surgical history: NO  Social: PCA   Hobbies: NOne  Past Medical History:   Diagnosis Date     Anxiety      Bipolar depression (H)      Chronic abdominal pain      Chronic back pain      Chronic insomnia      Depressive disorder      DVT (deep vein thrombosis) in pregnancy      Endometriosis      Ovarian cyst      PE (pulmonary thromboembolism) (H)      Social History     Socioeconomic History     Marital status: Single     Spouse name: Not on file     Number of children: Not on file     Years of education: Not on file     Highest education level: Not on file   Occupational History     Not on file   Social Needs     Financial resource strain: Not on file     Food insecurity     Worry: Not on file      "Inability: Not on file     Transportation needs     Medical: Not on file     Non-medical: Not on file   Tobacco Use     Smoking status: Not on file   Substance and Sexual Activity     Alcohol use: Not on file     Drug use: Not on file     Sexual activity: Not on file   Lifestyle     Physical activity     Days per week: Not on file     Minutes per session: Not on file     Stress: Not on file   Relationships     Social connections     Talks on phone: Not on file     Gets together: Not on file     Attends Religion service: Not on file     Active member of club or organization: Not on file     Attends meetings of clubs or organizations: Not on file     Relationship status: Not on file     Intimate partner violence     Fear of current or ex partner: Not on file     Emotionally abused: Not on file     Physically abused: Not on file     Forced sexual activity: Not on file   Other Topics Concern     Not on file   Social History Narrative     Not on file         Patient's past medical, surgical, social, and family histories were reviewed today and no changes are noted.  No family history pertinent to the patient's problem today    REVIEW OF SYSTEMS:  10 point ROS is negative other than symptoms noted above in HPI, Past Medical History or as stated below  Constitutional: NEGATIVE for fever, chills, change in weight  Skin: NEGATIVE for worrisome rashes, moles or lesions  GI/: NEGATIVE for bowel or bladder changes  Neuro: NEGATIVE for weakness, dizziness or paresthesias    OBJECTIVE:  /70   Ht 1.676 m (5' 6\")   Wt 115.7 kg (255 lb)   BMI 41.16 kg/m     General: healthy, alert and in no distress  HEENT: no scleral icterus or conjunctival erythema  Skin: no suspicious lesions or rash. No jaundice.  CV: no pedal edema  Resp: normal respiratory effort without conversational dyspnea   Psych: normal mood and affect  Gait: normal steady gait with appropriate coordination and balance  Neuro: normal light touch sensory exam " of the bilateral lower extremities.  DTR's unable to get b/l patella reflexes 1+ achilles bilaterally.  MSK:  THORACIC/LUMBAR SPINE  Inspection:    No gross deformity/asymmetry  Palpation:    Tender about the right para lumbar muscles, right SI joint and right sciatic notch. Otherwise remainder of landmarks are nontender.  Range of Motion:     Lumbar flexion full    Lumbar extension full  Strength:    5/5 - quadriceps, hamstrings, tibialis anterior, gastrocsoleus, and extensor hallicus longus  Special Tests:    Positive: straight leg raise (right), slump test (right), piriformis stretch test    Negative: straight leg raise (left), slump test (left), YONI (bilateral)    BILATERAL HIP  Inspection:    No obvious deformity or asymmetry, pelvis level  Palpation:    Tender about the ischial tuberosity and sciatic notch on right. Otherwise all other landmarks are nontender.  Active Range of Motion:     Flexion full, IR full, ER  full  Strength:    Flexion 5/5, adduction 5/5, abduction 5/5  Special Tests:    Positive: Piriformis stress test    Negative: Logroll, anterior impingement (FADIR), posterior impingement (EX/AB/ER)    Independent visualization of the below image:  No results found for this or any previous visit (from the past 24 hour(s)).    Jf,  In [facility] Generic [id number] - 10/20/2020 12:22 PM CDT  EXAM:  MR LUMBAR PLEXUS WITH ENTIRE NERVE PATH WO AND WITH IV CONTRAST    COMPARISON:  Pelvic MRI 07/08/2020    FINDINGS:  MRI of the lumbosacral plexus entire nerve path with and without IV  contrast. Image quality is degraded by body habitus.    Multiple bilateral ovarian cysts, one of which on the right contains presumed  hemorrhagic material (series 4 image 23 and series 5 image 24). The previously  described likely hemorrhagic cyst in the left ovary is no longer seen. Similar  fluid in the posterior cul-de-sac. Nabothian cysts in the cervix.    Grossly similar soft tissue thickening and  enhancement in the region of the  right uterosacral ligament and rectum (circa series 12 image 30). The right  lumbosacral plexus including the sciatic nerve is not discretely involved in  this process. The sciatic nerves are normal and symmetric in size and signal  intensity bilaterally. No abnormal contrast enhancement. Femoral nerves are also  normal and symmetric in size and signal intensity. No discrete intrinsic or  extrinsic mass. No denervation changes.    IMPRESSION:  No MRI evident abnormality of the lumbosacral plexus including the  sciatic and femoral nerves bilaterally.    Rodolfo Rhoades In [facility] Generic [id number] - 07/10/2020  9:23 AM CDT  EXAM: MR LUMBAR SPINE WITHOUT IV CONTRAST    COMPARISON: Radiographs of the entire spine 07/08/2020.    FINDINGS: Slightly limited exam by motion.    Borderline congenital narrowing of the spinal canal. Normal spinal alignment.  Somewhat diffusely decreased T1 marrow signal which, felt to reflect normal  variant. No fracture or marrow edema. Punctate foci of increased signal along  the inferior aspect of the right L4-L5 and mid aspect of the left L5-S1 facets,  of doubtful clinical significance (se 5/im10 and 3 respectively). No  periarticular or soft tissue edema.  Minimal bulge/trace central protrusion at L5-S1, without significant spinal  canal or neuroforaminal compromise. Normal conus terminates at midsuperior  aspect of L2. No clumping and thickening of cauda equina nerve roots.     Prominent endometrial stripe, normal for the patient's age.    IMPRESSION:  Minimal lower lumbar spondylosis, without significant spinal canal or  neuroforaminal compromise.           Large Joint Injection/Arthocentesis    Date/Time: 11/23/2020 11:47 AM  Performed by: Rajendra De Jesus MD  Authorized by: Rajendra De Jesus MD     Indications:  Pain  Needle Size:  22 G  Guidance: ultrasound    Approach:  Posterior  Location:  Hip   Location comment:  Right piriformis        Medications:  6 mg betamethasone acet & sod phos 6 (3-3) MG/ML; 2 mL ropivacaine 5 MG/ML  Outcome:  Tolerated well, no immediate complications  Procedure discussed: discussed risks, benefits, and alternatives    Consent Given by:  Patient  Timeout: timeout called immediately prior to procedure    Prep: patient was prepped and draped in usual sterile fashion     Patient reported increased numbness over injection site, no significant change in pain after right piriformis steroid injection over sciatic nerve.  Aftercare instructions given to patient.  Plan to follow-up as discussed above.     Rajendra De Jesus MD [facility]            Patient's conditions were thoroughly discussed during today's visit with greater than 50% of the visit spent counseling the patient with total time spent face-to-face with the patient being 40 minutes.    Rajendra De Jesus MD [facility]    Disclaimer: This note consists of symbols derived from keyboarding, dictation and/or voice recognition software. As a result, there may be errors in the script that have gone undetected. Please consider this when interpreting information found in this chart.

## 2020-11-23 NOTE — LETTER
11/23/2020         RE: Winifred Ibarra  [address]  [place] [address]        Dear Colleague,    Thank you for referring your patient, Winifred Ibarra, to the [facility] [place]. Please see a copy of my visit note below.    ASSESSMENT & PLAN  Winifred was seen today for pain.    Diagnoses and all orders for this visit:    Piriformis syndrome, right  Comments:  I suspect this is the cause of back  and leg pain.  avoid pressure on this area. consider doughnut. lidocaine patch to the region - appliued here - remove in 12 hours. and may reapply every 12 of every 24 hours OTC 4% patch. use tylenol 1000 mg every 6 hours.  may use oxycodone for breakthrough pain but exercise caution with this.  I would recommend  use of an antiinflammatory.  ibuprofen caused nausea.   Therefiore will use alternative med.  Will restart ce;lebrex - but you  need to take this with food or milk and would take prilosec 20 mg orally daily to prevent ulcer.  Orders:  -     Orthopedic & Spine  Referral  -     Large Joint Injection/Arthocentesis      Patient is a 30 year old female presenting for evaluation of   Chief Complaint   Patient presents with     Right Hip - Pain      # Right Buttock Pain with radicular Sx: Pt noting sx over the past several years with pain focally in the right buttock with some radiation to the posterior thigh and associated N/T in the foot.  Pt has TTP in the right sciatic notch as well as ischial tuberosity with pain going down leg and pos SLR/Slump and piriformis stretch testing.  Pt in visible discomfort on exam today sitting on baseball for comfort.  Outside records and image reads reviewed with patient from [facility] over the past several months.  She has had numerous imaging studies including lumbar plexus/lumbar MRI that were negative for significant disc herniation.  Differerential possible piriformis syndrome, lumbar radiculopathy vs hamstring  tendinopathy.  No red flag symptoms/signs on history or examination.  Counseled patient on nature of condition and treatment options.  Given this plan as below, follow-up as needed  Treatment: activities as tolerated, recommend to f/u with providers at [facility] given today, if not improved can refer for formal PT  Injection right piriformis steroid injection above sciatic nerve as below  Medications  Limited Tylenol    Concerning signs/sx that would warrant urgent evaluation were discussed.  All questions were answered, patient understands and agrees with plan.      Return if symptoms worsen or fail to improve.  -----    SUBJECTIVE  Winifred Ibarra is a/an 30 year old female who is seen in consultation at the request of  Fabrizio Longo M.D. for evaluation of piriformis pain. The patient is seen by themselves.    Onset: 3 years(s) ago. Reports insidious onset without acute precipitating event.   Location of Pain: right low back radiating to buttock and posterior thigh to knee   Rating of Pain at worst: 10/10  Rating of Pain Currently: 7/10  Worsened by: walking, standing, lying down   Better with: trigger point release   Treatments tried: Oxycodone, Celebrex, [facility], Lumar plexus MRI 10/20/20, Lumbar MRI 7/10/20  Quality: aching, dull  Red flags: Weakness: No, bowel/bladder loss: No, foot drop: No  Associated symptoms: numbness and tingling in right toes   Orthopedic history: YES - Chronic pelvic pain, DVT, PE, Endometriosis   Relevant surgical history: NO  Social: PCA   Hobbies: NOne  Past Medical History:   Diagnosis Date     Anxiety      Bipolar depression (H)      Chronic abdominal pain      Chronic back pain      Chronic insomnia      Depressive disorder      DVT (deep vein thrombosis) in pregnancy      Endometriosis      Ovarian cyst      PE (pulmonary thromboembolism) (H)      Social History     Socioeconomic History     Marital status: Single      "Spouse name: Not on file     Number of children: Not on file     Years of education: Not on file     Highest education level: Not on file   Occupational History     Not on file   Social Needs     Financial resource strain: Not on file     Food insecurity     Worry: Not on file     Inability: Not on file     Transportation needs     Medical: Not on file     Non-medical: Not on file   Tobacco Use     Smoking status: Not on file   Substance and Sexual Activity     Alcohol use: Not on file     Drug use: Not on file     Sexual activity: Not on file   Lifestyle     Physical activity     Days per week: Not on file     Minutes per session: Not on file     Stress: Not on file   Relationships     Social connections     Talks on phone: Not on file     Gets together: Not on file     Attends Sabianist service: Not on file     Active member of club or organization: Not on file     Attends meetings of clubs or organizations: Not on file     Relationship status: Not on file     Intimate partner violence     Fear of current or ex partner: Not on file     Emotionally abused: Not on file     Physically abused: Not on file     Forced sexual activity: Not on file   Other Topics Concern     Not on file   Social History Narrative     Not on file         Patient's past medical, surgical, social, and family histories were reviewed today and no changes are noted.  No family history pertinent to the patient's problem today    REVIEW OF SYSTEMS:  10 point ROS is negative other than symptoms noted above in HPI, Past Medical History or as stated below  Constitutional: NEGATIVE for fever, chills, change in weight  Skin: NEGATIVE for worrisome rashes, moles or lesions  GI/: NEGATIVE for bowel or bladder changes  Neuro: NEGATIVE for weakness, dizziness or paresthesias    OBJECTIVE:  /70   Ht 1.676 m (5' 6\")   Wt 115.7 kg (255 lb)   BMI 41.16 kg/m     General: healthy, alert and in no distress  HEENT: no scleral icterus or conjunctival " erythema  Skin: no suspicious lesions or rash. No jaundice.  CV: no pedal edema  Resp: normal respiratory effort without conversational dyspnea   Psych: normal mood and affect  Gait: normal steady gait with appropriate coordination and balance  Neuro: normal light touch sensory exam of the bilateral lower extremities.  DTR's unable to get b/l patella reflexes 1+ achilles bilaterally.  MSK:  THORACIC/LUMBAR SPINE  Inspection:    No gross deformity/asymmetry  Palpation:    Tender about the right para lumbar muscles, right SI joint and right sciatic notch. Otherwise remainder of landmarks are nontender.  Range of Motion:     Lumbar flexion full    Lumbar extension full  Strength:    5/5 - quadriceps, hamstrings, tibialis anterior, gastrocsoleus, and extensor hallicus longus  Special Tests:    Positive: straight leg raise (right), slump test (right), piriformis stretch test    Negative: straight leg raise (left), slump test (left), YONI (bilateral)    BILATERAL HIP  Inspection:    No obvious deformity or asymmetry, pelvis level  Palpation:    Tender about the ischial tuberosity and sciatic notch on right. Otherwise all other landmarks are nontender.  Active Range of Motion:     Flexion full, IR full, ER  full  Strength:    Flexion 5/5, adduction 5/5, abduction 5/5  Special Tests:    Positive: Piriformis stress test    Negative: Logroll, anterior impingement (FADIR), posterior impingement (EX/AB/ER)    Independent visualization of the below image:  No results found for this or any previous visit (from the past 24 hour(s)).    Jf  In [facility] Generic [id number] - 10/20/2020 12:22 PM CDT  EXAM:  MR LUMBAR PLEXUS WITH ENTIRE NERVE PATH WO AND WITH IV CONTRAST    COMPARISON:  Pelvic MRI 07/08/2020    FINDINGS:  MRI of the lumbosacral plexus entire nerve path with and without IV  contrast. Image quality is degraded by body habitus.    Multiple bilateral ovarian cysts, one of which on the right contains  presumed  hemorrhagic material (series 4 image 23 and series 5 image 24). The previously  described likely hemorrhagic cyst in the left ovary is no longer seen. Similar  fluid in the posterior cul-de-sac. Nabothian cysts in the cervix.    Grossly similar soft tissue thickening and enhancement in the region of the  right uterosacral ligament and rectum (circa series 12 image 30). The right  lumbosacral plexus including the sciatic nerve is not discretely involved in  this process. The sciatic nerves are normal and symmetric in size and signal  intensity bilaterally. No abnormal contrast enhancement. Femoral nerves are also  normal and symmetric in size and signal intensity. No discrete intrinsic or  extrinsic mass. No denervation changes.    IMPRESSION:  No MRI evident abnormality of the lumbosacral plexus including the  sciatic and femoral nerves bilaterally.    Rodolfo Rhoades [facility] Generic [id number] - 07/10/2020  9:23 AM CDT  EXAM: MR LUMBAR SPINE WITHOUT IV CONTRAST    COMPARISON: Radiographs of the entire spine 07/08/2020.    FINDINGS: Slightly limited exam by motion.    Borderline congenital narrowing of the spinal canal. Normal spinal alignment.  Somewhat diffusely decreased T1 marrow signal which, felt to reflect normal  variant. No fracture or marrow edema. Punctate foci of increased signal along  the inferior aspect of the right L4-L5 and mid aspect of the left L5-S1 facets,  of doubtful clinical significance (se 5/im10 and 3 respectively). No  periarticular or soft tissue edema.  Minimal bulge/trace central protrusion at L5-S1, without significant spinal  canal or neuroforaminal compromise. Normal conus terminates at midsuperior  aspect of L2. No clumping and thickening of cauda equina nerve roots.     Prominent endometrial stripe, normal for the patient's age.    IMPRESSION:  Minimal lower lumbar spondylosis, without significant spinal canal or  neuroforaminal compromise.           Large Joint  Injection/Arthocentesis    Date/Time: 11/23/2020 11:47 AM  Performed by: Rajendra De Jesus MD  Authorized by: Rajendra De Jesus MD     Indications:  Pain  Needle Size:  22 G  Guidance: ultrasound    Approach:  Posterior  Location:  Hip   Location comment:  Right piriformis       Medications:  6 mg betamethasone acet & sod phos 6 (3-3) MG/ML; 2 mL ropivacaine 5 MG/ML  Outcome:  Tolerated well, no immediate complications  Procedure discussed: discussed risks, benefits, and alternatives    Consent Given by:  Patient  Timeout: timeout called immediately prior to procedure    Prep: patient was prepped and draped in usual sterile fashion     Patient reported increased numbness over injection site, no significant change in pain after right piriformis steroid injection over sciatic nerve.  Aftercare instructions given to patient.  Plan to follow-up as discussed above.     Rajendra De Jesus MD [facility]            Patient's conditions were thoroughly discussed during today's visit with greater than 50% of the visit spent counseling the patient with total time spent face-to-face with the patient being 40 minutes.    Rajendra De Jesus MD [facility]    Disclaimer: This note consists of symbols derived from keyboarding, dictation and/or voice recognition software. As a result, there may be errors in the script that have gone undetected. Please consider this when interpreting information found in this chart.            Again, thank you for allowing me to participate in the care of your patient.        Sincerely,        Rajendra De Jesus MD

## 2020-11-23 NOTE — PATIENT INSTRUCTIONS
FSOC Injection Discharge Instructions    Diagnosis: Right piriformis syndrome vs lumbar radiculopathy  Image Findings: no disc herniation on previous lumbar MRI  Treatment: right piriformis steroid injection, home exercises  Medications: Limited tylenol/ibuprofen for pain for 1-2 weeks  Follow-up: With physicians and the [place]    Please call [phone]   Ask for my team if you have any questions or concerns    It was great seeing you  today!    Rajendra De Jesus MD, CAQSM          Procedure: Right Piriformis steroid injection      You may shower, however avoid swimming, tub baths or hot tubs for 24 hours following your procedure    You may have a mild to moderate increase in pain for several days following the injection.    It may take up to 14 days for the steroid medication to start working although you may feel the effect as early as a few days after the procedure.    You may use ice packs for 10-15 minutes, 3 to 4 times a day at the injection site for comfort    You may use anti-inflammatory medications (such as Ibuprofen or Aleve or Advil) or Tylenol for pain control if necessary    If you were fasting, you may resume your normal diet and medications after the procedure    If you have diabetes, check your blood sugar more frequently than usual as your blood sugar may be higher than normal for 10-14 days following a steroid injection. Contact your doctor who manages your diabetes if your blood sugar is higher than usual      If you experience any of the following, [place] [facility] [place] [phone] or [phone]  -Fever over 100 degree F  -Swelling, bleeding, redness, drainage, warmth at the injection site  - New or worsening pain

## 2020-11-24 RX ORDER — ROPIVACAINE HYDROCHLORIDE 5 MG/ML
2 INJECTION, SOLUTION EPIDURAL; INFILTRATION; PERINEURAL
Status: SHIPPED | OUTPATIENT
Start: 2020-11-23

## 2020-11-24 RX ORDER — BETAMETHASONE SODIUM PHOSPHATE AND BETAMETHASONE ACETATE 3; 3 MG/ML; MG/ML
6 INJECTION, SUSPENSION INTRA-ARTICULAR; INTRALESIONAL; INTRAMUSCULAR; SOFT TISSUE
Status: SHIPPED | OUTPATIENT
Start: 2020-11-23

## 2020-12-10 ENCOUNTER — HOSPITAL ENCOUNTER (EMERGENCY)
Facility: CLINIC | Age: 30
Discharge: HOME | End: 2020-12-10
Attending: EMERGENCY MEDICINE | Admitting: EMERGENCY MEDICINE
Payer: MEDICARE

## 2020-12-10 VITALS
WEIGHT: 260 LBS | HEIGHT: 66 IN | BODY MASS INDEX: 41.78 KG/M2 | TEMPERATURE: 99.1 F | DIASTOLIC BLOOD PRESSURE: 70 MMHG | HEART RATE: 66 BPM | RESPIRATION RATE: 20 BRPM | SYSTOLIC BLOOD PRESSURE: 122 MMHG | OXYGEN SATURATION: 100 %

## 2020-12-10 DIAGNOSIS — M54.31 SCIATIC NERVE PAIN, RIGHT: ICD-10-CM

## 2020-12-10 DIAGNOSIS — N80.9 ENDOMETRIOSIS: ICD-10-CM

## 2020-12-10 PROCEDURE — 250N000013 HC RX MED GY IP 250 OP 250 PS 637: Performed by: EMERGENCY MEDICINE

## 2020-12-10 PROCEDURE — 96374 THER/PROPH/DIAG INJ IV PUSH: CPT | Performed by: EMERGENCY MEDICINE

## 2020-12-10 PROCEDURE — 99284 EMERGENCY DEPT VISIT MOD MDM: CPT | Performed by: EMERGENCY MEDICINE

## 2020-12-10 PROCEDURE — 99284 EMERGENCY DEPT VISIT MOD MDM: CPT | Mod: 25 | Performed by: EMERGENCY MEDICINE

## 2020-12-10 PROCEDURE — 250N000011 HC RX IP 250 OP 636: Performed by: EMERGENCY MEDICINE

## 2020-12-10 RX ORDER — METHOCARBAMOL 750 MG/1
750-1500 TABLET, FILM COATED ORAL 4 TIMES DAILY PRN
Qty: 30 TABLET | Refills: 0 | Status: SHIPPED | OUTPATIENT
Start: 2020-12-10

## 2020-12-10 RX ORDER — KETOROLAC TROMETHAMINE 15 MG/ML
15 INJECTION, SOLUTION INTRAMUSCULAR; INTRAVENOUS ONCE
Status: COMPLETED | OUTPATIENT
Start: 2020-12-10 | End: 2020-12-10

## 2020-12-10 RX ORDER — METHOCARBAMOL 750 MG/1
1500 TABLET, FILM COATED ORAL ONCE
Status: COMPLETED | OUTPATIENT
Start: 2020-12-10 | End: 2020-12-10

## 2020-12-10 RX ORDER — MORPHINE SULFATE 15 MG/1
15 TABLET ORAL ONCE
Status: COMPLETED | OUTPATIENT
Start: 2020-12-10 | End: 2020-12-10

## 2020-12-10 RX ORDER — MORPHINE SULFATE 15 MG/1
7.5-15 TABLET ORAL EVERY 4 HOURS PRN
Qty: 6 TABLET | Refills: 0 | Status: SHIPPED | OUTPATIENT
Start: 2020-12-10

## 2020-12-10 RX ADMIN — MORPHINE SULFATE 15 MG: 15 TABLET ORAL at 04:07

## 2020-12-10 RX ADMIN — KETOROLAC TROMETHAMINE 15 MG: 15 INJECTION, SOLUTION INTRAMUSCULAR; INTRAVENOUS at 04:07

## 2020-12-10 RX ADMIN — METHOCARBAMOL 1500 MG: 750 TABLET, FILM COATED ORAL at 04:07

## 2020-12-10 ASSESSMENT — ENCOUNTER SYMPTOMS
WEAKNESS: 0
VOMITING: 0
DYSURIA: 0
ABDOMINAL PAIN: 0
SHORTNESS OF BREATH: 0
CHILLS: 0
FATIGUE: 0
NAUSEA: 0
CHEST TIGHTNESS: 0
COUGH: 0
DYSPHORIC MOOD: 0
FEVER: 0
BACK PAIN: 0
WOUND: 0
NUMBNESS: 0

## 2020-12-10 ASSESSMENT — MIFFLIN-ST. JEOR: SCORE: 1921.1

## 2020-12-10 NOTE — ED AVS SNAPSHOT
[facility] Emergency Dept  [address] [address]  Phone: [phone]  Fax: [phone]                                    Winifred Ibarra   MRN: [id number]    Department: [facility] Emergency Dept   Date of Visit: 12/10/2020           After Visit Summary Signature Page    I have received my discharge instructions, and my questions have been answered. I have discussed any challenges I see with this plan with the nurse or doctor.    ..........................................................................................................................................  Patient/Patient Representative Signature      ..........................................................................................................................................  Patient Representative Print Name and Relationship to Patient    ..................................................               ................................................  Date                                   Time    ..........................................................................................................................................  Reviewed by Signature/Title    ...................................................              ..............................................  Date                                               Time          22EPIC Rev 08/18

## 2020-12-10 NOTE — ED PROVIDER NOTES
History     Chief Complaint   Patient presents with     Back Pain     HPI  Winifred Ibarra is a 30 year old female with a history of bipolar disorder, OCD , and PTSD as well as history of chronic pain and substance abuse presenting for evaluation of recurrence pain in her right buttocks area radiating down her posterior right thigh.  Patient has had several recurrences of these pains previously and has been worked up on multiple previous visits both to the ER as well as to [place].  Patient reports that her symptoms were thought most likely to be due to underlying endometriosis as her symptoms flareup consistently with her menses.   pain was flaring up today causing her to stumble and fall to the ground.  No significant injury from the fall but she feels like this also flared her pain even more in her right buttocks area.  Has previously had work-up with MRI and CT imaging without distinct clear etiology identified.  No new neurologic symptoms including no numbness, weakness, bladder or bowel incontinence.  Symptoms similar to previous flareups without significant change.  Pain is worse with flexion of her right hip.  Denies any back pain.      ==================================================================    CHART REVIEW:    Office visit 11/23/2020:    # Right Buttock Pain with radicular Sx: Pt noting sx over the past several years with pain focally in the right buttock with some radiation to the posterior thigh and associated N/T in the foot.  Pt has TTP in the right sciatic notch as well as ischial tuberosity with pain going down leg and pos SLR/Slump and piriformis stretch testing.  Pt in visible discomfort on exam today sitting on baseball for comfort.  Outside records and image reads reviewed with patient from [facility] over the past several months.  She has had numerous imaging studies including lumbar plexus/lumbar MRI that were negative for significant disc herniation.  Differerential possible  piriformis syndrome, lumbar radiculopathy vs hamstring tendinopathy.  No red flag symptoms/signs on history or examination.  Counseled patient on nature of condition and treatment options.  Given this plan as below, follow-up as needed  Treatment: activities as tolerated, recommend to f/u with providers at [place]  Physical Therapy HEP given today, if not improved can refer for formal PT  Injection right piriformis steroid injection above sciatic nerve as below  Medications  Limited Tylenol    MRI 10/20/2020  IMPRESSION:  No MRI evident abnormality of the lumbosacral plexus including the  sciatic and femoral nerves bilaterally.    END CHART REVIEW  ==================================================================      Allergies:  Allergies   Allergen Reactions     Gold      Ibuprofen Nausea and Vomiting     Silver      Sodium Hypochlorite      Bleach         Problem List:    Patient Active Problem List    Diagnosis Date Noted     Muscle spasm 08/28/2020     Priority: Medium     Pelvic and perineal pain 07/09/2020     Priority: Medium     Endometriosis 04/09/2019     Priority: Medium     Added automatically from request for surgery [id number]  Added automatically from request for surgery [id number]  Added automatically from request for surgery [id number]  Added automatically from request for surgery [id number]       Hx pulmonary embolism 04/08/2019     Priority: Medium     Per patient report in [place] [place] negative workup for provoking factors, stopped anticoagulation use when moved to [place] not by medical advise  Per patient report in [place] [place] negative workup for provoking factors, stopped anticoagulation use when moved to [place] not by medical advise  Per patient report in [place] [place] negative workup for provoking factors, stopped anticoagulation use when moved to [place] not by medical advise  Per patient report in [place] [place] negative workup for provoking factors, stopped anticoagulation use when moved to [place] not by medical  "advise       Dysmenorrhea 05/28/2018     Priority: Medium     Acute stress reaction 12/12/2015     Priority: Medium     Partner \"put me out\" on Wednesday, 12/09/15.       Bipolar disorder, curr episode depressed, severe, w/psychotic features (H) 12/12/2015     Priority: Medium     H/o \"getting the shot\" (deconoate medication?), previous hospitalization in [place] (4 years ago when pt 22 yo).       History of being obese 12/12/2015     Priority: Medium     Suicidal ideations 12/12/2015     Priority: Medium     Bipolar affective disorder, currently depressed, moderate (H) 05/27/2015     Priority: Medium     Depression with anxiety 05/27/2015     Priority: Medium     OCD (obsessive compulsive disorder) 05/27/2015     Priority: Medium     PTSD (post-traumatic stress disorder) 05/27/2015     Priority: Medium     Encounter for long-term (current) use of medications 05/27/2015     Priority: Medium     Encounter for long-term (current) use of other medications       History of DVT (deep vein thrombosis) 05/27/2015     Priority: Medium     History of DVT (deep vein thrombosis) - per patient recall  History of DVT (deep vein thrombosis) - per patient recall  History of DVT (deep vein thrombosis) - per patient recall  History of DVT (deep vein thrombosis) - per patient recall       Panic attacks 05/27/2015     Priority: Medium     Social phobia 05/27/2015     Priority: Medium     Substance abuse in remission (H) 05/27/2015     Priority: Medium        Past Medical History:    Past Medical History:   Diagnosis Date     Anxiety      Bipolar depression (H)      Chronic abdominal pain      Chronic back pain      Chronic insomnia      Depressive disorder      DVT (deep vein thrombosis) in pregnancy      Endometriosis      Ovarian cyst      PE (pulmonary thromboembolism) (H)        Past Surgical History:    Past Surgical History:   Procedure Laterality Date     DAVINCI ASSISTED ABLATION / EXCISION OF ENDOMETRIOSIS         Family " "History:    No family history on file.    Social History:  Marital Status:  Single [1]  Social History     Tobacco Use     Smoking status: Never Smoker     Smokeless tobacco: Never Used   Substance Use Topics     Alcohol use: Not on file     Drug use: Not on file        Medications:         methocarbamol (ROBAXIN) 750 MG tablet       morphine (MSIR) 15 MG IR tablet       celecoxib (CELEBREX) 200 MG capsule       medical cannabis (Patient's own supply)       omeprazole (PRILOSEC) 20 MG DR capsule       ondansetron (ZOFRAN ODT) 4 MG ODT tab          Review of Systems   Constitutional: Negative for chills, fatigue and fever.   HENT: Negative for congestion.    Respiratory: Negative for cough, chest tightness and shortness of breath.    Cardiovascular: Negative for chest pain.   Gastrointestinal: Negative for abdominal pain, nausea and vomiting.   Genitourinary: Negative for decreased urine volume and dysuria.   Musculoskeletal: Negative for back pain.        Pain in right buttock rating down right leg   Skin: Negative for wound.   Neurological: Negative for weakness and numbness.   Psychiatric/Behavioral: Negative for dysphoric mood.   All other systems reviewed and are negative.      Physical Exam   BP: 112/76  Pulse: 90  Temp: 99.1  F (37.3  C)  Resp: 20  Height: 167.6 cm (5' 6\")  Weight: 117.9 kg (260 lb)  SpO2: 100 %      Physical Exam  Vitals signs and nursing note reviewed.   Constitutional:       Appearance: She is obese. She is not ill-appearing or diaphoretic.      Comments: Appears uncomfortable and frustrated   HENT:      Head: Atraumatic.      Nose: Nose normal.   Eyes:      Conjunctiva/sclera: Conjunctivae normal.   Neck:      Musculoskeletal: Normal range of motion.   Cardiovascular:      Rate and Rhythm: Normal rate.      Pulses: Normal pulses.   Pulmonary:      Effort: Pulmonary effort is normal.   Abdominal:      Tenderness: There is no abdominal tenderness.   Musculoskeletal:      Right hip: She " exhibits no tenderness.      Comments: Straight leg raise on the right positive with worsening of her gluteal pain when leg raised more than about 15 degrees.  No worsening symptoms with left leg raise.   Skin:     General: Skin is warm and dry.      Capillary Refill: Capillary refill takes less than 2 seconds.   Neurological:      General: No focal deficit present.      Mental Status: She is alert and oriented to person, place, and time.      Sensory: No sensory deficit.      Motor: No weakness.   Psychiatric:         Mood and Affect: Mood normal.         ED Course        Procedures             No results found for this or any previous visit (from the past 24 hour(s)).    Medications   methocarbamol (ROBAXIN) tablet 1,500 mg (1,500 mg Oral Given 12/10/20 0407)   morphine (MSIR) IR tablet 15 mg (15 mg Oral Given 12/10/20 0407)   ketorolac (TORADOL) injection 15 mg (15 mg Intravenous Given 12/10/20 0407)       Assessments & Plan (with Medical Decision Making)  30-year-old female with history of bipolar disorder, OCD, PTSD, and chronic pain presenting for evaluation of worsening pain in her right buttocks rating down her right posterior thigh.  Has been seen for this multiple times previously.  Has had work-up including MRI and CT imaging.  Has also seen sports medicine where she had injections done recently which provided no relief.  Pain reportedly associated with her menses and thought to be also associated with endometriosis.  Currently not on any hormonal therapy to regulate her cycle.  Does have symptoms suggestive of sciatica on exam in the ED without any neurologic compromise.  Reported a ground-level fall today but does not appear to have any significant injury from this.  Recommended symptomatic treatment as given above.  Referred her to gynecology to discuss consideration for hormonal therapy to regulate her menses although chart review does show history of DVT which may complicate this treatment.  Provided  a short course of pain medications.  Also encouraged her to follow-up with pain management here to discuss a long-term pain control strategy.  Consult placed for pain control follow-up     I have reviewed the nursing notes.    I have reviewed the findings, diagnosis, plan and need for follow up with the patient.       New Prescriptions    METHOCARBAMOL (ROBAXIN) 750 MG TABLET    Take 1-2 tablets (750-1,500 mg) by mouth 4 times daily as needed for muscle spasms    MORPHINE (MSIR) 15 MG IR TABLET    Take 0.5-1 tablets (7.5-15 mg) by mouth every 4 hours as needed for moderate to severe pain       Final diagnoses:   Sciatic nerve pain, right   Endometriosis       12/10/2020   [facility] EMERGENCY DEPT     Mendez, Sreekanth Bunn MD  12/10/20 6382

## 2020-12-10 NOTE — ED TRIAGE NOTES
Pt comes in with chronic right sided lower back pain. Pain was so bad pt was unable to sleep. 10/10 pt is tearful. Pt took 1000 mg of tylenol at 0200. Menstrual cycle started yesterday.

## 2020-12-30 NOTE — PROGRESS NOTES
"12/30/2020     E-Consult has been denied due to: Doesn't meet criteria for E-Consult.  At this time econsults are being used only for primary care    Interprofessional consultation requested by: Sreekanth Mendez     Clinical Question/Purpose: \"Pain plan\"     Patient assessment and information reviewed: none    Recommendations: none     The recommendations provided in this E-Consult are based on the clinical data available to me at this time, and are furnished without the benefit of a comprehensive in-person or virtual patient evaluation, Any new clinical issues or changes in patient status since the filing of this E-Consult will need to be taken into account when assessing these recommendations. Please contact me if you have further questions.    My total time spent reviewing clinical information and formulating assessment was 5 minutes.    Report sent automatically to requesting provider once signed.     Charge code: [id number] (5+ minutes)     No name on file.    "

## 2021-01-03 ENCOUNTER — HEALTH MAINTENANCE LETTER (OUTPATIENT)
Age: 31
End: 2021-01-03

## 2021-06-02 NOTE — PROGRESS NOTES
Walk In Care Note                                                                                 Date of Visit: 10/26/2019     Chief Complaint   Winifred Ibarra is a(n) 29 y.o. Black or  female who presents to [facility] with the following complaint(s):  Knee Injury (RT Knee-- fell 2 days ago 10/24 at home, hurt R/T knee. Pain with walking, movement. )       Assessment and Plan   1. Sprain of medial collateral ligament of right knee, initial encounter  - Crutches Standard      Patient presenting with 2-day history of medial right knee pain following a fall at home.  Patient does report frequent falls, and states that she is seeing Neurology next week due to this issue.  Knee examination is consistent with a sprain of the medial collateral ligament, though she does also have some medial joint line tenderness that could suggest a meniscal injury as well.  The knee joint is overall stable.  X-rays not completed given absence of bony tenderness.  Treating sprain with rest, compression with an Ace bandage that was applied today, routine application of ice for 20 minutes at least 4 times per day, use of crutches as needed, and acetaminophen as needed for discomfort since the patient is unable to take ibuprofen due to GI upset from this medication.    Counseled patient regarding assessment and plan for evaluation and treatment. Questions were answered. See AVS for the specific written instructions and educational handout(s) regarding collateral ligament sprain that were provided at the conclusion of the visit.     Discussed signs / symptoms that warrant urgent / emergent medical attention.     Follow up with primary care within 2 weeks if symptoms fail to resolve.     History of Present Illness   Primary symptom: Pain  Onset: 2 day(s) ago  Location: Anteromedial right knee  Radiation: No  Progression: Improved slightly  Frequency: Intermittent with movement and activity only  Description:  "Sharp  Rating (0-10): 7  Exacerbating factors: Bending the knee to get into a car or get out of a chair. The knee is also tender to touch.   Relieving factors: Sitting with the knee extended; has no pain in this position.   Associated bruising: No  Associated swelling: No  Associated erythema: No  Associated fevers: No  Associated chills: No  Additional symptoms: None  Home therapies utilized: None. Has not taken any analgesics or applied ice / heat.   Known injury: Fell at home on the evening of 10/24/2019. Fell to the floor while walking. Reports that she falls frequently and will be seeing Neurology next week due to frequent falls. Denies loss of consciousness and other injuries.   History of similar pain: Reports tearing a ligament in her right knee as a teenager.   History of surgery in this area: No  Tobacco use / exposure: Former smoker     Review of Systems   Review of Systems   All other systems reviewed and are negative.       Physical Exam   Vitals:    10/26/19 0911   BP: 127/82   Patient Site: Right Arm   Patient Position: Sitting   Cuff Size: Adult Large   Pulse: 65   Resp: 18   SpO2: 100%   Weight: (!) 277 lb (125.6 kg)   Height: 5' 6.5\" (1.689 m)     Physical Exam  Vitals signs and nursing note reviewed.   Constitutional:       General: She is not in acute distress.     Appearance: She is well-developed. She is obese. She is not ill-appearing or toxic-appearing.   Cardiovascular:      Rate and Rhythm: Normal rate and regular rhythm.      Heart sounds: S1 normal and S2 normal. No murmur. No friction rub. No gallop.    Pulmonary:      Effort: Pulmonary effort is normal.      Breath sounds: Normal breath sounds. No stridor. No wheezing, rhonchi or rales.   Musculoskeletal:      Right knee: She exhibits normal range of motion, no swelling, no effusion, no ecchymosis, no deformity, no laceration, no erythema, normal alignment, no LCL laxity, normal patellar mobility, no bony tenderness and no MCL laxity. " Tenderness found. Medial joint line and MCL (valgus stress testing positive for pain but negative for instability) tenderness noted. No lateral joint line, no LCL (varus stress testing negative) and no patellar tendon tenderness noted.      Comments: Anterior and posterior drawer testing of the right knee negative.    Skin:     General: Skin is warm and dry.      Coloration: Skin is not pale.      Findings: No ecchymosis, erythema or rash.   Neurological:      General: No focal deficit present.      Mental Status: She is alert and oriented to person, place, and time.   Psychiatric:         Behavior: Behavior is cooperative.          Diagnostic Studies   Laboratory:  N/A  Radiology:  N/A  Electrocardiogram:  N/A     Procedure Note   N/A     Pertinent History   The following portions of the patient's history were reviewed and updated as appropriate: allergies, current medications, past family history, past medical history, past social history, past surgical history and problem list.    Patient has Dysmenorrhea; Endometriosis; History of DVT (deep vein thrombosis); Hx pulmonary embolism; Panic attacks; Bipolar affective disorder, currently depressed, moderate (H); Depression with anxiety; OCD (obsessive compulsive disorder); PTSD (post-traumatic stress disorder); Social phobia; and Substance abuse in remission (H) on their problem list.    Patient has a past medical history of Bipolar affective disorder, currently depressed, moderate (H) (5/27/2015), Depression with anxiety (5/27/2015), Dysmenorrhea (5/28/2018), Endometriosis, History of pulmonary embolism, OCD (obsessive compulsive disorder) (5/27/2015), Panic attacks, PTSD (post-traumatic stress disorder) (5/27/2015), Social phobia (5/27/2015), and Substance abuse in remission (H) (5/27/2015).    Patient has a past surgical history that includes Laparoscopic endometriosis fulguration (09/11/2019).    Patient's family history is not on file. She was  adopted.    Patient reports that she has never smoked. She has never used smokeless tobacco. She reports that she does not drink alcohol or use drugs.     Portions of this note have been dictated using voice recognition software. Any grammatical or context distortions are unintentional and inherent to the software.     Haile Jacome MD  [facility]

## 2021-06-03 VITALS
HEIGHT: 67 IN | HEART RATE: 65 BPM | DIASTOLIC BLOOD PRESSURE: 82 MMHG | RESPIRATION RATE: 18 BRPM | OXYGEN SATURATION: 100 % | SYSTOLIC BLOOD PRESSURE: 127 MMHG | WEIGHT: 277 LBS | BODY MASS INDEX: 43.47 KG/M2

## 2021-06-17 NOTE — PATIENT INSTRUCTIONS - HE
Patient Instructions by Haile Jacome MD at 10/26/2019  9:10 AM     Author: Haile Jacome MD Service: -- Author Type: Physician    Filed: 10/26/2019  9:43 AM Encounter Date: 10/26/2019 Status: Addendum    : Haile Jacome MD (Physician)    Related Notes: Original Note by Haile Jacome MD (Physician) filed at 10/26/2019  9:43 AM       -Your knee examination is consistent with a medial collateral ligament sprain, though the underlying joint line tenderness could suggest a medial meniscal tear as well.  -Wear the Ace wrap provided today on the knee during the day.  Remove this at night to prevent swelling in your lower leg.  -Use crutches as needed for support/stability.  -Apply ice to the inner aspect of the right knee for 20 minutes at least 4 times per day.  -Take acetaminophen as needed for pain since you are unable to take ibuprofen.  Patient Education     Knee Sprain of the Collateral Ligaments  The knee is a hinge joint supported by four strong ligaments. The two ligaments inside the knee protect this joint from excess forward and backward movement. The ligaments on the outside of the joint prevent side-to-side motion. These are called the collateral ligaments.  The medial collateral ligament is located on the inner side of the joint; and the lateral collateral ligament is on the outer side of the joint.  You have sprained one or both collateral ligaments. A sprain is a tearing of a ligament. The tear may be partial or complete. Diagnosis is made by physical exam. In the case of an acute injury, the knee may be too swollen or painful to examine fully. A more accurate exam can be done after the initial swelling goes down.  Symptoms of a knee sprain include immediate knee swelling, pain, and difficulty walking. Initial treatment includes rest, splinting the knee to reduce movement of the joint, and icing the knee to reduce swelling and pain. You may use over-the-counter pain  medicine to control pain, unless another medicine was prescribed. Most sprains will heal in 3 to 6 weeks. A severe injury can take 3 to 4 months to heal. You will also need rehab exercises. Surgery is usually not required for sprains involving only the collateral ligaments.  Home care    Stay off the injured leg as much as possible until you can walk on it without pain. If you have a lot of pain while walking, crutches, or a walker may be prescribed. These can be rented or purchased at many pharmacies and surgical or orthopedic supply stores. Follow your healthcare providers advice about when to begin bearing weight on that leg.     If you were given a hook-and-loop closure knee brace, you can remove it to bathe. But leave it in place when walking, sitting, or lying down unless told otherwise.    Apply an ice pack over the injured area for 15 to 20 minutes every 3 to 6 hours. You should do this for the first 24 to 48 hours. You can make an ice pack by filling a plastic bag that seals at the top with ice cubes and then wrapping it with a thin towel. Continue to use ice packs for relief of pain and swelling as needed. As the ice melts, be careful to avoid getting your wrap, splint, or cast wet. After 48 hours, apply heat (warm shower or warm bath) for 15 to 20 minutes several times a day, or alternate ice and heat. You can place the ice pack directly over the splint. If you have to wear a hook-and-loop knee brace, you can open it to apply the ice pack, or heat, directly to the knee. Never put ice directly on the skin. Always wrap the ice in a towel or other type of cloth.    You may use over-the-counter pain medicine to control pain, unless another pain medicine was prescribed. Anti-inflammatory pain medicines, such as ibuprofen or naproxen may be more effective than acetaminophen. If you have chronic liver or kidney disease or ever had a stomach ulcer or GI bleeding, talk with your healthcare provider before using  these medicines.  Follow-up care  Follow up with your healthcare provider as advised. Any X-rays you had today dont show any broken bones, breaks, or fractures. Sometimes fractures dont show up on the first X-ray. Bruises and sprains can sometimes hurt as much as a fracture. These injuries can take time to heal completely. If your symptoms dont improve or they get worse, talk with your healthcare provider. You may need a repeat X-ray. If X-rays were taken, you will be told of any new findings that may affect your care.  Call 911  Call 911 if you have:     Shortness of breath     Chest pain  When to seek medical advice  Call your healthcare provider right away if any of these occur:    Pain or swelling increases    Swelling, redness, or pain in the calf or thigh  Date Last Reviewed: 11/20/2015 2000-2017 [employer]. [address] [place] [address]. All rights reserved. This information is not intended as a substitute for professional medical care. Always follow your healthcare professional's instructions.

## 2021-10-10 ENCOUNTER — HEALTH MAINTENANCE LETTER (OUTPATIENT)
Age: 31
End: 2021-10-10

## 2021-10-14 ENCOUNTER — HOSPITAL ENCOUNTER (EMERGENCY)
Facility: HOSPITAL | Age: 31
Discharge: HOME | End: 2021-10-14
Attending: EMERGENCY MEDICINE | Admitting: EMERGENCY MEDICINE
Payer: MEDICARE

## 2021-10-14 VITALS
HEART RATE: 72 BPM | OXYGEN SATURATION: 100 % | TEMPERATURE: 97.9 F | DIASTOLIC BLOOD PRESSURE: 80 MMHG | BODY MASS INDEX: 48.42 KG/M2 | RESPIRATION RATE: 18 BRPM | WEIGHT: 293 LBS | SYSTOLIC BLOOD PRESSURE: 133 MMHG

## 2021-10-14 DIAGNOSIS — Z77.21 EXPOSURE TO BLOOD OR BODY FLUID: ICD-10-CM

## 2021-10-14 LAB — HIV 1+2 AB+HIV1 P24 AG SERPL QL IA: NEGATIVE

## 2021-10-14 PROCEDURE — 36415 COLL VENOUS BLD VENIPUNCTURE: CPT | Performed by: STUDENT IN AN ORGANIZED HEALTH CARE EDUCATION/TRAINING PROGRAM

## 2021-10-14 PROCEDURE — 87389 HIV-1 AG W/HIV-1&-2 AB AG IA: CPT | Performed by: STUDENT IN AN ORGANIZED HEALTH CARE EDUCATION/TRAINING PROGRAM

## 2021-10-14 PROCEDURE — 99283 EMERGENCY DEPT VISIT LOW MDM: CPT

## 2021-10-14 PROCEDURE — 250N000013 HC RX MED GY IP 250 OP 250 PS 637: Performed by: EMERGENCY MEDICINE

## 2021-10-14 RX ORDER — EMTRICITABINE AND TENOFOVIR DISOPROXIL FUMARATE 200; 300 MG/1; MG/1
1 TABLET, FILM COATED ORAL ONCE
Status: COMPLETED | OUTPATIENT
Start: 2021-10-14 | End: 2021-10-14

## 2021-10-14 RX ADMIN — RALTEGRAVIR 400 MG: 400 TABLET, FILM COATED ORAL at 17:01

## 2021-10-14 RX ADMIN — EMTRICITABINE AND TENOFOVIR DISOPROXIL FUMARATE 1 TABLET: 200; 300 TABLET, FILM COATED ORAL at 16:32

## 2021-10-14 ASSESSMENT — ENCOUNTER SYMPTOMS
CHILLS: 0
BRUISES/BLEEDS EASILY: 0
ABDOMINAL PAIN: 0
FEVER: 0
COUGH: 0
SHORTNESS OF BREATH: 0

## 2021-10-14 NOTE — DISCHARGE INSTRUCTIONS
All of the appropriate blood tests have been sent off and the results will not come back today.  You need to contact the health department later today or first thing tomorrow morning for follow-up.  You have had 1 dose already of postexposure prophylaxis.  The health department will need to continue this if they feel that it is necessary.

## 2021-10-14 NOTE — ED TRIAGE NOTES
Pt here for prophylactic medication for possible HIV exposure yesterday. Pt states the blood of someone who has HIV may have gotten on her yesterday. She was sharing a cigarette with this person. The patient bites the inside of her mouth often, so she is concerned the virus could have passed to her.

## 2021-10-14 NOTE — ED PROVIDER NOTES
"EMERGENCY DEPARTMENT ENCOUNTER       ED Course & Medical Decision Making           3:55 PM I consulted with pharmacy on the patient's case.   4:00 PM I met with the patient to gather history and to perform my initial exam. I discussed the plan for care while in the Emergency Department. PPE ( gloves, surgical mask) was worn during patient encounters.       I did see and examined the patient. She is young and otherwise healthy. This is a low risk exposure. I will provide her with postexposure prophylaxis for today and refer her to the health department tomorrow for additional therapy. I have consulted pharmacy to help with dosing and medication choices. See MAR. She has no other complaints and plan is for discharge at this time        Prior to making a final disposition on this patient the results of patient's tests and other diagnostic studies were discussed with the patient. All questions were answered. Patient expressed understanding of the plan and was amenable to it.    Medications   raltegravir (ISENTRESS) tablet 400 mg (has no administration in time range)   emtricitabine-tenofovir (TRUVADA) 200-300 MG per tablet 1 tablet (1 tablet Oral Given 10/14/21 1632)       Final Impression     1. Exposure to blood or body fluid            Chief Complaint     Chief Complaint   Patient presents with     HIV exposure       Pt here for prophylactic medication for possible HIV exposure yesterday. Pt states the blood of someone who has HIV may have gotten on her yesterday. She was sharing a cigarette with this person. The patient bites the inside of her mouth often, so she is concerned the virus could have passed to her.       RIMA Ibarra is a 31 year old female who has a history of bipolar depression, chronic insomnia, PE, and DVT presents for evaluation of HIV exposure.     Patient reports she was smoking with another person on 10/13 (~1 day ago), \"but not cigarettes\", and patient does not elaborate. As she " was smoking, patient noticed there was blood on the opening, but continued to smoke. She later found out the patient has HIV and is here today to get tested and obtain medications for HIV prevention. Patient denies additional medical concerns or complaints at this time.       I, Juana Sorensenjackiejuan am serving as a scribe to document services personally performed by Sunny Jenkins M.D. based on my observation and the provider's statements to me. I, Sunny Jenkins M.D attest that Juana Medina is acting in a scribe capacity, has observed my performance of the services and has documented them in accordance with my direction.    Past Medical History     Past Medical History:   Diagnosis Date     Anxiety      Bipolar depression (H)      Chronic abdominal pain      Chronic back pain      Chronic insomnia      Depressive disorder      DVT (deep vein thrombosis) in pregnancy      Endometriosis      Ovarian cyst      PE (pulmonary thromboembolism) (H)      Past Surgical History:   Procedure Laterality Date     DAVINCI ASSISTED ABLATION / EXCISION OF ENDOMETRIOSIS       LAPAROSCOPIC ENDOMETRIOSIS FULGURATION  09/11/2019     Family History   Adopted: Yes      Social History     Tobacco Use     Smoking status: Smoker, Current Status Unknown     Types: Cigarettes     Smokeless tobacco: Never Used   Substance Use Topics     Alcohol use: None     Drug use: None       Relevant past medical, surgical, family and social history as documented above, has been reviewed and discussed with patient. No changes or additions, unless otherwise noted in the HPI.    Current Medications     celecoxib (CELEBREX) 200 MG capsule  medical cannabis (Patient's own supply)  methocarbamol (ROBAXIN) 750 MG tablet  morphine (MSIR) 15 MG IR tablet  ondansetron (ZOFRAN ODT) 4 MG ODT tab        Allergies     Allergies   Allergen Reactions     Gold      Ibuprofen Nausea and Vomiting     Silver      Sodium Hypochlorite      Bleach         Review of  Systems     Review of Systems   Constitutional: Negative for chills and fever.   Respiratory: Negative for cough and shortness of breath.    Cardiovascular: Negative for chest pain.   Gastrointestinal: Negative for abdominal pain.   Hematological: Does not bruise/bleed easily.         Remainder of systems reviewed, unless noted in HPI all others negative.    Physical Exam     /80   Pulse 72   Temp 97.9  F (36.6  C) (Temporal)   Resp 18   Wt 136.1 kg (300 lb)   SpO2 100%   BMI 48.42 kg/m      Physical Exam  Constitutional:       General: She is not in acute distress.     Appearance: She is not diaphoretic.   HENT:      Head: Atraumatic.      Mouth/Throat:      Pharynx: No oropharyngeal exudate.   Eyes:      General: No scleral icterus.     Pupils: Pupils are equal, round, and reactive to light.   Cardiovascular:      Rate and Rhythm: Normal rate.   Pulmonary:      Effort: No respiratory distress.   Skin:     Coloration: Skin is not jaundiced.             Labs & Imaging         Labs Ordered and Resulted from Time of ED Arrival Up to the Time of Departure from the ED - No data to display                    Sunny Jenkins MD  10/14/21 9429

## 2022-01-30 ENCOUNTER — HEALTH MAINTENANCE LETTER (OUTPATIENT)
Age: 32
End: 2022-01-30

## 2022-09-18 ENCOUNTER — HEALTH MAINTENANCE LETTER (OUTPATIENT)
Age: 32
End: 2022-09-18

## 2023-05-07 ENCOUNTER — HEALTH MAINTENANCE LETTER (OUTPATIENT)
Age: 33
End: 2023-05-07

## 2023-05-30 NOTE — DISCHARGE INSTRUCTIONS
Call primary care today regarding follow-up and treatment of pain with prescription pain medication    Follow-up with GYN in [place] as scheduled next week   1-2 cups/cans per day

## 2024-07-14 ENCOUNTER — HEALTH MAINTENANCE LETTER (OUTPATIENT)
Age: 34
End: 2024-07-14